# Patient Record
Sex: MALE | Race: WHITE | Employment: FULL TIME | URBAN - METROPOLITAN AREA
[De-identification: names, ages, dates, MRNs, and addresses within clinical notes are randomized per-mention and may not be internally consistent; named-entity substitution may affect disease eponyms.]

---

## 2024-08-10 ENCOUNTER — HOSPITAL ENCOUNTER (INPATIENT)
Age: 54
LOS: 1 days | Discharge: LEFT BEFORE DISCHARGE | DRG: 770 | End: 2024-08-11
Attending: EMERGENCY MEDICINE | Admitting: INTERNAL MEDICINE
Payer: COMMERCIAL

## 2024-08-10 DIAGNOSIS — F10.10 ALCOHOL ABUSE: Primary | ICD-10-CM

## 2024-08-10 PROBLEM — F10.139 ALCOHOL ABUSE WITH WITHDRAWAL (HCC): Status: ACTIVE | Noted: 2024-08-10

## 2024-08-10 LAB
ALBUMIN SERPL-MCNC: 4.6 G/DL (ref 3.4–5)
ALP SERPL-CCNC: 173 U/L (ref 40–129)
ALT SERPL-CCNC: 47 U/L (ref 10–40)
AMMONIA PLAS-SCNC: 35 UMOL/L (ref 16–60)
ANION GAP SERPL CALCULATED.3IONS-SCNC: 12 MMOL/L (ref 3–16)
AST SERPL-CCNC: 78 U/L (ref 15–37)
BASOPHILS # BLD: 0.1 K/UL (ref 0–0.2)
BASOPHILS NFR BLD: 0.9 %
BILIRUB DIRECT SERPL-MCNC: 0.3 MG/DL (ref 0–0.3)
BILIRUB INDIRECT SERPL-MCNC: 0.4 MG/DL (ref 0–1)
BILIRUB SERPL-MCNC: 0.7 MG/DL (ref 0–1)
BILIRUB UR QL STRIP.AUTO: NEGATIVE
BUN SERPL-MCNC: 10 MG/DL (ref 7–20)
CALCIUM SERPL-MCNC: 9 MG/DL (ref 8.3–10.6)
CHLORIDE SERPL-SCNC: 94 MMOL/L (ref 99–110)
CLARITY UR: CLEAR
CO2 SERPL-SCNC: 26 MMOL/L (ref 21–32)
COLOR UR: YELLOW
CREAT SERPL-MCNC: <0.5 MG/DL (ref 0.9–1.3)
DEPRECATED RDW RBC AUTO: 14.7 % (ref 12.4–15.4)
EKG ATRIAL RATE: 79 BPM
EKG DIAGNOSIS: NORMAL
EKG P AXIS: 32 DEGREES
EKG P-R INTERVAL: 166 MS
EKG Q-T INTERVAL: 402 MS
EKG QRS DURATION: 104 MS
EKG QTC CALCULATION (BAZETT): 460 MS
EKG R AXIS: -61 DEGREES
EKG T AXIS: 44 DEGREES
EKG VENTRICULAR RATE: 79 BPM
EOSINOPHIL # BLD: 0.2 K/UL (ref 0–0.6)
EOSINOPHIL NFR BLD: 2.2 %
ETHANOLAMINE SERPL-MCNC: 166 MG/DL (ref 0–0.08)
GFR SERPLBLD CREATININE-BSD FMLA CKD-EPI: >90 ML/MIN/{1.73_M2}
GLUCOSE SERPL-MCNC: 115 MG/DL (ref 70–99)
GLUCOSE UR STRIP.AUTO-MCNC: NEGATIVE MG/DL
HCT VFR BLD AUTO: 44.2 % (ref 40.5–52.5)
HGB BLD-MCNC: 14.7 G/DL (ref 13.5–17.5)
HGB UR QL STRIP.AUTO: NEGATIVE
KETONES UR STRIP.AUTO-MCNC: NEGATIVE MG/DL
LEUKOCYTE ESTERASE UR QL STRIP.AUTO: NEGATIVE
LIPASE SERPL-CCNC: 26 U/L (ref 13–60)
LYMPHOCYTES # BLD: 2.6 K/UL (ref 1–5.1)
LYMPHOCYTES NFR BLD: 26.4 %
MCH RBC QN AUTO: 28.9 PG (ref 26–34)
MCHC RBC AUTO-ENTMCNC: 33.4 G/DL (ref 31–36)
MCV RBC AUTO: 86.6 FL (ref 80–100)
MONOCYTES # BLD: 0.7 K/UL (ref 0–1.3)
MONOCYTES NFR BLD: 6.7 %
NEUTROPHILS # BLD: 6.2 K/UL (ref 1.7–7.7)
NEUTROPHILS NFR BLD: 63.8 %
NITRITE UR QL STRIP.AUTO: NEGATIVE
PH UR STRIP.AUTO: 6 [PH] (ref 5–8)
PLATELET # BLD AUTO: 200 K/UL (ref 135–450)
PMV BLD AUTO: 7.1 FL (ref 5–10.5)
POTASSIUM SERPL-SCNC: 3.9 MMOL/L (ref 3.5–5.1)
PROT SERPL-MCNC: 8 G/DL (ref 6.4–8.2)
PROT UR STRIP.AUTO-MCNC: NEGATIVE MG/DL
RBC # BLD AUTO: 5.1 M/UL (ref 4.2–5.9)
SODIUM SERPL-SCNC: 132 MMOL/L (ref 136–145)
SP GR UR STRIP.AUTO: <=1.005 (ref 1–1.03)
UA COMPLETE W REFLEX CULTURE PNL UR: NORMAL
UA DIPSTICK W REFLEX MICRO PNL UR: NORMAL
URN SPEC COLLECT METH UR: NORMAL
UROBILINOGEN UR STRIP-ACNC: 0.2 E.U./DL
WBC # BLD AUTO: 9.8 K/UL (ref 4–11)

## 2024-08-10 PROCEDURE — 99285 EMERGENCY DEPT VISIT HI MDM: CPT

## 2024-08-10 PROCEDURE — 2500000003 HC RX 250 WO HCPCS: Performed by: PHYSICIAN ASSISTANT

## 2024-08-10 PROCEDURE — 80048 BASIC METABOLIC PNL TOTAL CA: CPT

## 2024-08-10 PROCEDURE — 83690 ASSAY OF LIPASE: CPT

## 2024-08-10 PROCEDURE — 96365 THER/PROPH/DIAG IV INF INIT: CPT

## 2024-08-10 PROCEDURE — 6370000000 HC RX 637 (ALT 250 FOR IP): Performed by: PHYSICIAN ASSISTANT

## 2024-08-10 PROCEDURE — 85025 COMPLETE CBC W/AUTO DIFF WBC: CPT

## 2024-08-10 PROCEDURE — 6360000002 HC RX W HCPCS: Performed by: NURSE PRACTITIONER

## 2024-08-10 PROCEDURE — 80076 HEPATIC FUNCTION PANEL: CPT

## 2024-08-10 PROCEDURE — 36415 COLL VENOUS BLD VENIPUNCTURE: CPT

## 2024-08-10 PROCEDURE — 2580000003 HC RX 258: Performed by: PHYSICIAN ASSISTANT

## 2024-08-10 PROCEDURE — 6360000002 HC RX W HCPCS: Performed by: PHYSICIAN ASSISTANT

## 2024-08-10 PROCEDURE — 82140 ASSAY OF AMMONIA: CPT

## 2024-08-10 PROCEDURE — 1200000000 HC SEMI PRIVATE

## 2024-08-10 PROCEDURE — 81003 URINALYSIS AUTO W/O SCOPE: CPT

## 2024-08-10 PROCEDURE — 93005 ELECTROCARDIOGRAM TRACING: CPT | Performed by: PHYSICIAN ASSISTANT

## 2024-08-10 PROCEDURE — 2580000003 HC RX 258: Performed by: NURSE PRACTITIONER

## 2024-08-10 PROCEDURE — 93010 ELECTROCARDIOGRAM REPORT: CPT | Performed by: INTERNAL MEDICINE

## 2024-08-10 PROCEDURE — 6370000000 HC RX 637 (ALT 250 FOR IP): Performed by: NURSE PRACTITIONER

## 2024-08-10 PROCEDURE — 82077 ASSAY SPEC XCP UR&BREATH IA: CPT

## 2024-08-10 RX ORDER — LORAZEPAM 2 MG/1
2 TABLET ORAL
Status: DISCONTINUED | OUTPATIENT
Start: 2024-08-10 | End: 2024-08-10

## 2024-08-10 RX ORDER — ONDANSETRON 4 MG/1
4 TABLET, ORALLY DISINTEGRATING ORAL EVERY 8 HOURS PRN
Status: CANCELLED | OUTPATIENT
Start: 2024-08-10

## 2024-08-10 RX ORDER — LANOLIN ALCOHOL/MO/W.PET/CERES
100 CREAM (GRAM) TOPICAL DAILY
Status: DISCONTINUED | OUTPATIENT
Start: 2024-08-10 | End: 2024-08-11 | Stop reason: HOSPADM

## 2024-08-10 RX ORDER — SODIUM CHLORIDE 0.9 % (FLUSH) 0.9 %
5-40 SYRINGE (ML) INJECTION EVERY 12 HOURS SCHEDULED
Status: DISCONTINUED | OUTPATIENT
Start: 2024-08-10 | End: 2024-08-10

## 2024-08-10 RX ORDER — SODIUM CHLORIDE 0.9 % (FLUSH) 0.9 %
5-40 SYRINGE (ML) INJECTION PRN
Status: DISCONTINUED | OUTPATIENT
Start: 2024-08-10 | End: 2024-08-10

## 2024-08-10 RX ORDER — M-VIT,TX,IRON,MINS/CALC/FOLIC 27MG-0.4MG
1 TABLET ORAL DAILY
COMMUNITY

## 2024-08-10 RX ORDER — LORAZEPAM 2 MG/ML
3 INJECTION INTRAMUSCULAR
Status: DISCONTINUED | OUTPATIENT
Start: 2024-08-10 | End: 2024-08-11 | Stop reason: HOSPADM

## 2024-08-10 RX ORDER — LORAZEPAM 1 MG/1
1 TABLET ORAL
Status: DISCONTINUED | OUTPATIENT
Start: 2024-08-10 | End: 2024-08-10

## 2024-08-10 RX ORDER — LORAZEPAM 2 MG/ML
2 INJECTION INTRAMUSCULAR
Status: DISCONTINUED | OUTPATIENT
Start: 2024-08-10 | End: 2024-08-11 | Stop reason: HOSPADM

## 2024-08-10 RX ORDER — ACETAMINOPHEN 325 MG/1
650 TABLET ORAL EVERY 6 HOURS PRN
Status: DISCONTINUED | OUTPATIENT
Start: 2024-08-10 | End: 2024-08-11 | Stop reason: HOSPADM

## 2024-08-10 RX ORDER — POTASSIUM CHLORIDE 7.45 MG/ML
10 INJECTION INTRAVENOUS PRN
Status: CANCELLED | OUTPATIENT
Start: 2024-08-10

## 2024-08-10 RX ORDER — POTASSIUM CHLORIDE 20 MEQ/1
40 TABLET, EXTENDED RELEASE ORAL PRN
Status: CANCELLED | OUTPATIENT
Start: 2024-08-10

## 2024-08-10 RX ORDER — LORAZEPAM 1 MG/1
1 TABLET ORAL
Status: DISCONTINUED | OUTPATIENT
Start: 2024-08-10 | End: 2024-08-11 | Stop reason: HOSPADM

## 2024-08-10 RX ORDER — ENOXAPARIN SODIUM 100 MG/ML
40 INJECTION SUBCUTANEOUS DAILY
Status: CANCELLED | OUTPATIENT
Start: 2024-08-10

## 2024-08-10 RX ORDER — M-VIT,TX,IRON,MINS/CALC/FOLIC 27MG-0.4MG
1 TABLET ORAL DAILY
Status: DISCONTINUED | OUTPATIENT
Start: 2024-08-10 | End: 2024-08-11 | Stop reason: HOSPADM

## 2024-08-10 RX ORDER — SODIUM CHLORIDE 0.9 % (FLUSH) 0.9 %
5-40 SYRINGE (ML) INJECTION EVERY 12 HOURS SCHEDULED
Status: CANCELLED | OUTPATIENT
Start: 2024-08-10

## 2024-08-10 RX ORDER — LORAZEPAM 2 MG/ML
2 INJECTION INTRAMUSCULAR PRN
Status: DISCONTINUED | OUTPATIENT
Start: 2024-08-10 | End: 2024-08-10

## 2024-08-10 RX ORDER — POLYETHYLENE GLYCOL 3350 17 G/17G
17 POWDER, FOR SOLUTION ORAL DAILY PRN
Status: CANCELLED | OUTPATIENT
Start: 2024-08-10

## 2024-08-10 RX ORDER — LORAZEPAM 2 MG/ML
3 INJECTION INTRAMUSCULAR
Status: DISCONTINUED | OUTPATIENT
Start: 2024-08-10 | End: 2024-08-10

## 2024-08-10 RX ORDER — ACETAMINOPHEN 650 MG/1
650 SUPPOSITORY RECTAL EVERY 6 HOURS PRN
Status: CANCELLED | OUTPATIENT
Start: 2024-08-10

## 2024-08-10 RX ORDER — POTASSIUM CHLORIDE 20 MEQ/1
40 TABLET, EXTENDED RELEASE ORAL PRN
Status: DISCONTINUED | OUTPATIENT
Start: 2024-08-10 | End: 2024-08-11 | Stop reason: HOSPADM

## 2024-08-10 RX ORDER — SODIUM CHLORIDE 0.9 % (FLUSH) 0.9 %
5-40 SYRINGE (ML) INJECTION PRN
Status: DISCONTINUED | OUTPATIENT
Start: 2024-08-10 | End: 2024-08-11 | Stop reason: HOSPADM

## 2024-08-10 RX ORDER — LORAZEPAM 2 MG/1
2 TABLET ORAL
Status: DISCONTINUED | OUTPATIENT
Start: 2024-08-10 | End: 2024-08-11 | Stop reason: HOSPADM

## 2024-08-10 RX ORDER — ENOXAPARIN SODIUM 100 MG/ML
30 INJECTION SUBCUTANEOUS 2 TIMES DAILY
Status: DISCONTINUED | OUTPATIENT
Start: 2024-08-10 | End: 2024-08-11 | Stop reason: HOSPADM

## 2024-08-10 RX ORDER — SODIUM CHLORIDE 9 MG/ML
INJECTION, SOLUTION INTRAVENOUS PRN
Status: CANCELLED | OUTPATIENT
Start: 2024-08-10

## 2024-08-10 RX ORDER — LORAZEPAM 2 MG/ML
1 INJECTION INTRAMUSCULAR
Status: DISCONTINUED | OUTPATIENT
Start: 2024-08-10 | End: 2024-08-10

## 2024-08-10 RX ORDER — ACETAMINOPHEN 650 MG/1
650 SUPPOSITORY RECTAL EVERY 6 HOURS PRN
Status: DISCONTINUED | OUTPATIENT
Start: 2024-08-10 | End: 2024-08-11 | Stop reason: HOSPADM

## 2024-08-10 RX ORDER — ONDANSETRON 2 MG/ML
4 INJECTION INTRAMUSCULAR; INTRAVENOUS EVERY 6 HOURS PRN
Status: DISCONTINUED | OUTPATIENT
Start: 2024-08-10 | End: 2024-08-11 | Stop reason: HOSPADM

## 2024-08-10 RX ORDER — LORAZEPAM 2 MG/ML
1 INJECTION INTRAMUSCULAR
Status: DISCONTINUED | OUTPATIENT
Start: 2024-08-10 | End: 2024-08-11 | Stop reason: HOSPADM

## 2024-08-10 RX ORDER — SODIUM CHLORIDE 9 MG/ML
INJECTION, SOLUTION INTRAVENOUS CONTINUOUS
Status: DISCONTINUED | OUTPATIENT
Start: 2024-08-10 | End: 2024-08-11 | Stop reason: HOSPADM

## 2024-08-10 RX ORDER — LANOLIN ALCOHOL/MO/W.PET/CERES
100 CREAM (GRAM) TOPICAL DAILY
Status: DISCONTINUED | OUTPATIENT
Start: 2024-08-11 | End: 2024-08-10

## 2024-08-10 RX ORDER — SODIUM CHLORIDE 0.9 % (FLUSH) 0.9 %
5-40 SYRINGE (ML) INJECTION PRN
Status: CANCELLED | OUTPATIENT
Start: 2024-08-10

## 2024-08-10 RX ORDER — ENOXAPARIN SODIUM 100 MG/ML
40 INJECTION SUBCUTANEOUS DAILY
Status: DISCONTINUED | OUTPATIENT
Start: 2024-08-10 | End: 2024-08-10 | Stop reason: DRUGHIGH

## 2024-08-10 RX ORDER — LORAZEPAM 2 MG/ML
4 INJECTION INTRAMUSCULAR
Status: DISCONTINUED | OUTPATIENT
Start: 2024-08-10 | End: 2024-08-10

## 2024-08-10 RX ORDER — DIAZEPAM 5 MG/1
5 TABLET ORAL EVERY 6 HOURS PRN
COMMUNITY

## 2024-08-10 RX ORDER — SODIUM CHLORIDE 9 MG/ML
INJECTION, SOLUTION INTRAVENOUS PRN
Status: DISCONTINUED | OUTPATIENT
Start: 2024-08-10 | End: 2024-08-11 | Stop reason: HOSPADM

## 2024-08-10 RX ORDER — MAGNESIUM SULFATE IN WATER 40 MG/ML
2000 INJECTION, SOLUTION INTRAVENOUS PRN
Status: CANCELLED | OUTPATIENT
Start: 2024-08-10

## 2024-08-10 RX ORDER — SODIUM CHLORIDE 9 MG/ML
INJECTION, SOLUTION INTRAVENOUS PRN
Status: DISCONTINUED | OUTPATIENT
Start: 2024-08-10 | End: 2024-08-10

## 2024-08-10 RX ORDER — POTASSIUM CHLORIDE 7.45 MG/ML
10 INJECTION INTRAVENOUS PRN
Status: DISCONTINUED | OUTPATIENT
Start: 2024-08-10 | End: 2024-08-11 | Stop reason: HOSPADM

## 2024-08-10 RX ORDER — LORAZEPAM 2 MG/ML
4 INJECTION INTRAMUSCULAR
Status: DISCONTINUED | OUTPATIENT
Start: 2024-08-10 | End: 2024-08-11 | Stop reason: HOSPADM

## 2024-08-10 RX ORDER — SODIUM CHLORIDE 0.9 % (FLUSH) 0.9 %
5-40 SYRINGE (ML) INJECTION EVERY 12 HOURS SCHEDULED
Status: DISCONTINUED | OUTPATIENT
Start: 2024-08-10 | End: 2024-08-11 | Stop reason: HOSPADM

## 2024-08-10 RX ORDER — ACETAMINOPHEN 325 MG/1
650 TABLET ORAL EVERY 6 HOURS PRN
Status: CANCELLED | OUTPATIENT
Start: 2024-08-10

## 2024-08-10 RX ORDER — ONDANSETRON 4 MG/1
4 TABLET, ORALLY DISINTEGRATING ORAL EVERY 8 HOURS PRN
Status: DISCONTINUED | OUTPATIENT
Start: 2024-08-10 | End: 2024-08-11 | Stop reason: HOSPADM

## 2024-08-10 RX ORDER — POLYETHYLENE GLYCOL 3350 17 G/17G
17 POWDER, FOR SOLUTION ORAL DAILY PRN
Status: DISCONTINUED | OUTPATIENT
Start: 2024-08-10 | End: 2024-08-11 | Stop reason: HOSPADM

## 2024-08-10 RX ORDER — LORAZEPAM 2 MG/ML
2 INJECTION INTRAMUSCULAR
Status: DISCONTINUED | OUTPATIENT
Start: 2024-08-10 | End: 2024-08-10

## 2024-08-10 RX ORDER — LORAZEPAM 2 MG/1
4 TABLET ORAL
Status: DISCONTINUED | OUTPATIENT
Start: 2024-08-10 | End: 2024-08-11 | Stop reason: HOSPADM

## 2024-08-10 RX ORDER — LORAZEPAM 2 MG/1
4 TABLET ORAL
Status: DISCONTINUED | OUTPATIENT
Start: 2024-08-10 | End: 2024-08-10

## 2024-08-10 RX ORDER — ONDANSETRON 2 MG/ML
4 INJECTION INTRAMUSCULAR; INTRAVENOUS EVERY 6 HOURS PRN
Status: CANCELLED | OUTPATIENT
Start: 2024-08-10

## 2024-08-10 RX ADMIN — Medication 10 ML: at 19:52

## 2024-08-10 RX ADMIN — Medication 100 MG: at 19:49

## 2024-08-10 RX ADMIN — LORAZEPAM 1 MG: 1 TABLET ORAL at 15:39

## 2024-08-10 RX ADMIN — ENOXAPARIN SODIUM 30 MG: 100 INJECTION SUBCUTANEOUS at 21:02

## 2024-08-10 RX ADMIN — LORAZEPAM 2 MG: 2 INJECTION INTRAMUSCULAR; INTRAVENOUS at 17:58

## 2024-08-10 RX ADMIN — FOLIC ACID: 5 INJECTION, SOLUTION INTRAMUSCULAR; INTRAVENOUS; SUBCUTANEOUS at 15:59

## 2024-08-10 RX ADMIN — LORAZEPAM 2 MG: 2 INJECTION INTRAMUSCULAR; INTRAVENOUS at 19:49

## 2024-08-10 RX ADMIN — SODIUM CHLORIDE: 9 INJECTION, SOLUTION INTRAVENOUS at 19:30

## 2024-08-10 RX ADMIN — I-VITE, TAB 1000-60-2MG (60/BT) 1 TABLET: TAB at 19:49

## 2024-08-10 ASSESSMENT — PAIN SCALES - GENERAL
PAINLEVEL_OUTOF10: 0

## 2024-08-10 ASSESSMENT — PAIN - FUNCTIONAL ASSESSMENT
PAIN_FUNCTIONAL_ASSESSMENT: 0-10
PAIN_FUNCTIONAL_ASSESSMENT: NONE - DENIES PAIN
PAIN_FUNCTIONAL_ASSESSMENT: 0-10

## 2024-08-10 ASSESSMENT — LIFESTYLE VARIABLES
HOW OFTEN DO YOU HAVE A DRINK CONTAINING ALCOHOL: 4 OR MORE TIMES A WEEK
HOW MANY STANDARD DRINKS CONTAINING ALCOHOL DO YOU HAVE ON A TYPICAL DAY: 10 OR MORE

## 2024-08-10 NOTE — ED PROVIDER NOTES
[address] MEDICAL-SURGICAL  EMERGENCY DEPARTMENT ENCOUNTER        Pt Name: Rod Herring  MRN: [id number]  Birthdate 1970  Date of evaluation: 8/10/2024  Provider: Lamont Mayorga PA-C  PCP: Alexei Aguirre MD  Note Started: 4:36 PM EDT 8/10/24       I have seen and evaluated this patient with my supervising physician Gamal Terry MD.      CHIEF COMPLAINT       Chief Complaint   Patient presents with    Alcohol Problem     Patient states that he drinks 30 beers a day, wants to stop drinking.       HISTORY OF PRESENT ILLNESS: 1 or more Elements     History From: Patient    Limitations to history : None    Social Determinants Significantly Affecting Health : None    Chief Complaint: Alcohol abuse, withdrawal    Rod Herring is a 54 y.o. male who presents to the ED requesting admission for treatment for alcohol abuse.  Patient has extensive history of alcohol abuse.  He was admitted for alcohol abuse and detox about 3 years ago and was able to stay sober for about 2 years.  Approximately 1 year ago he began drinking again and is now drinking roughly 30 beers per day.  Patient's last drink was at 130 this afternoon.  He denies any complaints as of this time but has had problems with withdrawal in the past.  Patient does take Valium 5 mg 3 times daily for this.  He admits to a 20 pound weight gain in roughly 2 months.  He denies any additional complaints.  He denies fever, rash, nausea, vomiting, weakness, dizziness, chest pain or difficulty breathing, back pain, abdominal pain, shaking/tremors, difficulty speaking, change in bowels or urine, confusion, neck pain or stiffness, or any additional systemic or neurologic complaints.  He admits to several month history of bilateral peripheral neuropathy in his feet.  He is prediabetic and his A1c measurements have become much worse since he began drinking again.    Nursing Notes were all reviewed and agreed with or any disagreements were addressed

## 2024-08-10 NOTE — PLAN OF CARE
Admit to 2w    Alcohol withdrawal   On PRN Valium at home- did not order on admission  CIWA with PRN ativan       Orquieda Adair, NADEEM - CNP

## 2024-08-10 NOTE — FLOWSHEET NOTE
08/10/24 1842   Vital Signs   Temp 98 °F (36.7 °C)   Temp Source Oral   Pulse 95   Respirations 16   BP (!) 159/92   MAP (Calculated) 114   BP Location Left upper arm   BP Method Automatic   Patient Position High fowlers   Pain Assessment   Pain Assessment None - Denies Pain   Opioid-Induced Sedation   POSS Score 1   RASS   Solis Agitation Sedation Scale (RASS) 0   Oxygen Therapy   SpO2 96 %   O2 Device None (Room air)       Alert and oriented. Arrived from ER. Skin warm and moist.  Resp ee unlabored. Called respiratory informing of cpap from home.  Orders released.  CHG wipes given to patient. Dietary called for meal tray.  VSS. No s/s distress noted. Bed alarm on. Telesitter in room.

## 2024-08-10 NOTE — PLAN OF CARE
Problem: Discharge Planning  Goal: Discharge to home or other facility with appropriate resources  Outcome: Progressing  Flowsheets (Taken 8/10/2024 3308)  Discharge to home or other facility with appropriate resources: Identify barriers to discharge with patient and caregiver     Problem: Safety - Adult  Goal: Free from fall injury  Outcome: Progressing

## 2024-08-10 NOTE — ED PROVIDER NOTES
THIS IS MY MATTHEW SUPERVISORY AND SHARED VISIT NOTE:    I personally saw the patient and made/approved the management plan and take responsibility for the patient management.    History: 54-year-old male presenting for evaluation of alcohol problem.  Patient reports he drinks about 30 beers on a daily basis and wants to quit drinking alcohol.  He states that he has low quality of life and wants to stop drinking.  He reports that he has gone to the hospital about 8 years ago for alcohol detox and noted to had quite severe alcohol withdrawal symptoms at that time.  He was sober for about 2 years after that.  He states that he is motivated to stop drinking this time.    Exam: No acute distress alert and oriented x 4 no respiratory distress no obvious tremors.  No seizure-like activity    MDM: 54-year-old male presenting for evaluation of alcohol problem he is seeking alcohol detox.  He is hypertensive on initial vital signs otherwise vitals are within normal limits.  Will obtain laboratory evaluation, [facility] protocol.  Due to significant alcohol intake, patient will likely have withdrawal symptoms shortly.  Patient is agreeable with plan for admission to facilitate alcohol detoxification.      I personally saw the patient and independently provided 0 minutes of non-concurrent critical care out of the total shared critical care time provided.       No results found.      I, Dr. Terry, am the primary clinician of record.     Comment: Please note this report has been produced using speech recognition software and may contain errors related to that system including errors in grammar, punctuation, and spelling, as well as words and phrases that may be inappropriate. If there are any questions or concerns please feel free to contact the dictating provider for clarification.     EKG  The Ekg interpreted by myself in the emergency department in the absence of a cardiologist.  normal sinus rhythm with a rate of 79  Axis is

## 2024-08-11 VITALS
HEART RATE: 73 BPM | HEIGHT: 70 IN | BODY MASS INDEX: 32.21 KG/M2 | OXYGEN SATURATION: 97 % | TEMPERATURE: 98.2 F | DIASTOLIC BLOOD PRESSURE: 86 MMHG | WEIGHT: 225 LBS | RESPIRATION RATE: 18 BRPM | SYSTOLIC BLOOD PRESSURE: 164 MMHG

## 2024-08-11 LAB
ANION GAP SERPL CALCULATED.3IONS-SCNC: 9 MMOL/L (ref 3–16)
BASOPHILS # BLD: 0 K/UL (ref 0–0.2)
BASOPHILS NFR BLD: 0.6 %
BUN SERPL-MCNC: 11 MG/DL (ref 7–20)
CALCIUM SERPL-MCNC: 8.8 MG/DL (ref 8.3–10.6)
CHLORIDE SERPL-SCNC: 101 MMOL/L (ref 99–110)
CO2 SERPL-SCNC: 27 MMOL/L (ref 21–32)
CREAT SERPL-MCNC: 0.6 MG/DL (ref 0.9–1.3)
DEPRECATED RDW RBC AUTO: 15.1 % (ref 12.4–15.4)
EOSINOPHIL # BLD: 0.3 K/UL (ref 0–0.6)
EOSINOPHIL NFR BLD: 3.9 %
GFR SERPLBLD CREATININE-BSD FMLA CKD-EPI: >90 ML/MIN/{1.73_M2}
GLUCOSE SERPL-MCNC: 108 MG/DL (ref 70–99)
HCT VFR BLD AUTO: 39.6 % (ref 40.5–52.5)
HGB BLD-MCNC: 13.5 G/DL (ref 13.5–17.5)
LYMPHOCYTES # BLD: 2.2 K/UL (ref 1–5.1)
LYMPHOCYTES NFR BLD: 28.8 %
MCH RBC QN AUTO: 29.3 PG (ref 26–34)
MCHC RBC AUTO-ENTMCNC: 34.1 G/DL (ref 31–36)
MCV RBC AUTO: 86.2 FL (ref 80–100)
MONOCYTES # BLD: 0.7 K/UL (ref 0–1.3)
MONOCYTES NFR BLD: 9.2 %
NEUTROPHILS # BLD: 4.4 K/UL (ref 1.7–7.7)
NEUTROPHILS NFR BLD: 57.5 %
PLATELET # BLD AUTO: 173 K/UL (ref 135–450)
PMV BLD AUTO: 7.7 FL (ref 5–10.5)
POTASSIUM SERPL-SCNC: 3.8 MMOL/L (ref 3.5–5.1)
RBC # BLD AUTO: 4.6 M/UL (ref 4.2–5.9)
SODIUM SERPL-SCNC: 137 MMOL/L (ref 136–145)
WBC # BLD AUTO: 7.7 K/UL (ref 4–11)

## 2024-08-11 PROCEDURE — 85025 COMPLETE CBC W/AUTO DIFF WBC: CPT

## 2024-08-11 PROCEDURE — 6360000002 HC RX W HCPCS: Performed by: NURSE PRACTITIONER

## 2024-08-11 PROCEDURE — 99232 SBSQ HOSP IP/OBS MODERATE 35: CPT | Performed by: INTERNAL MEDICINE

## 2024-08-11 PROCEDURE — 80048 BASIC METABOLIC PNL TOTAL CA: CPT

## 2024-08-11 PROCEDURE — 2580000003 HC RX 258: Performed by: NURSE PRACTITIONER

## 2024-08-11 PROCEDURE — 36415 COLL VENOUS BLD VENIPUNCTURE: CPT

## 2024-08-11 PROCEDURE — 6370000000 HC RX 637 (ALT 250 FOR IP): Performed by: INTERNAL MEDICINE

## 2024-08-11 PROCEDURE — 6370000000 HC RX 637 (ALT 250 FOR IP): Performed by: NURSE PRACTITIONER

## 2024-08-11 RX ORDER — NICOTINE 21 MG/24HR
1 PATCH, TRANSDERMAL 24 HOURS TRANSDERMAL DAILY
Status: DISCONTINUED | OUTPATIENT
Start: 2024-08-11 | End: 2024-08-11 | Stop reason: HOSPADM

## 2024-08-11 RX ADMIN — SODIUM CHLORIDE: 9 INJECTION, SOLUTION INTRAVENOUS at 11:17

## 2024-08-11 RX ADMIN — ENOXAPARIN SODIUM 30 MG: 100 INJECTION SUBCUTANEOUS at 07:57

## 2024-08-11 RX ADMIN — Medication 100 MG: at 07:58

## 2024-08-11 RX ADMIN — I-VITE, TAB 1000-60-2MG (60/BT) 1 TABLET: TAB at 07:58

## 2024-08-11 RX ADMIN — LORAZEPAM 1 MG: 1 TABLET ORAL at 16:07

## 2024-08-11 NOTE — PROGRESS NOTES
4 Eyes Skin Assessment     NAME:  Rod Herring  YOB: 1970  MEDICAL RECORD NUMBER:  [id number]    The patient is being assessed for  Admission    I agree that at least one RN has performed a thorough Head to Toe Skin Assessment on the patient. ALL assessment sites listed below have been assessed.      Areas assessed by both nurses:    Head, Face, Ears, Shoulders, Back, Chest, Arms, Elbows, Hands, Sacrum. Buttock, Coccyx, Ischium, and Legs. Feet and Heels        Does the Patient have a Wound? No noted wound(s)       Oscar Prevention initiated by RN: No  Wound Care Orders initiated by RN: No    Pressure Injury (Stage 3,4, Unstageable, DTI, NWPT, and Complex wounds) if present, place Wound referral order by RN under : No    New Ostomies, if present place, Ostomy referral order under : No     Nurse 1 eSignature: Electronically signed by Rhonda Pompa RN on 8/10/24 at 6:53 PM EDT    **SHARE this note so that the co-signing nurse can place an eSignature**    Nurse 2 eSignature: Electronically signed by Betsy Ennis RN on 8/11/24 at 5:17 AM EDT    
Bedside Mobility Assessment Tool (BMAT):     Assessment Level 1- Sit and Shake    1. From a semi-reclined position, ask patient to sit up and rotate to a seated position at the side of the bed. Can use the bedrail.    2. Ask patient to reach out and grab your hand and shake making sure patient reaches across his/her midline.   Pass- Patient is able to come to a seated position, maintain core strength. Maintains seated balance while reaching across midline. Move on to Assessment Level 2.     Assessment Level 2- Stretch and Point   1. With patient in seated position at the side of the bed, have patient place both feet on the floor (or stool) with knees no higher than hips.    2. Ask patient to stretch one leg and straighten the knee, then bend the ankle/flex and point the toes. If appropriate, repeat with the other leg.   Pass- Patient is able to demonstrate appropriate quad strength on intended weight bearing limb(s). Move onto Assessment Level 3.     Assessment Level 3- Stand   1. Ask patient to elevate off the bed or chair (seated to standing) using an assistive device (cane, bedrail).    2. Patient should be able to raise buttocks off be and hold for a count of five. May repeat once.   Pass- Patient maintains standing stability for at least 5 seconds, proceed to assessment level 4.    Assessment Level 4- Walk   1. Ask patient to march in place at bedside.    2. Then ask patient to advance step and return each foot. Some medical conditions may render a patient from stepping backwards, use your best clinical judgement.   Pass- Patient demonstrates balance while shifting weight and ability to step, takes independent steps, does not use assistive device patient is MOBILITY LEVEL 4.      Mobility Level- 4    
Bedside Mobility Assessment Tool (BMAT):     Assessment Level 1- Sit and Shake    1. From a semi-reclined position, ask patient to sit up and rotate to a seated position at the side of the bed. Can use the bedrail.    2. Ask patient to reach out and grab your hand and shake making sure patient reaches across his/her midline.   Pass- Patient is able to come to a seated position, maintain core strength. Maintains seated balance while reaching across midline. Move on to Assessment Level 2.     Assessment Level 2- Stretch and Point   1. With patient in seated position at the side of the bed, have patient place both feet on the floor (or stool) with knees no higher than hips.    2. Ask patient to stretch one leg and straighten the knee, then bend the ankle/flex and point the toes. If appropriate, repeat with the other leg.   Pass- Patient is able to demonstrate appropriate quad strength on intended weight bearing limb(s). Move onto Assessment Level 3.     Assessment Level 3- Stand   1. Ask patient to elevate off the bed or chair (seated to standing) using an assistive device (cane, bedrail).    2. Patient should be able to raise buttocks off be and hold for a count of five. May repeat once.   Pass- Patient maintains standing stability for at least 5 seconds, proceed to assessment level 4.    Assessment Level 4- Walk   1. Ask patient to march in place at bedside.    2. Then ask patient to advance step and return each foot. Some medical conditions may render a patient from stepping backwards, use your best clinical judgement.   Pass- Patient demonstrates balance while shifting weight and ability to step, takes independent steps, does not use assistive device patient is MOBILITY LEVEL 4.      Mobility Level- 4     Patient is able to demonstrate the ability to move from a reclining position to an upright position within the recliner.   
No acute events noted throughout the shift. VSS. CIWA's x 2 with highest score of 13. No complaints of pain. Home CPAP has been in use throughout the shift. Normal saline infusing at 125 ml/hr per provider order. Tele sitter in place and active. Bed alarm active. Call bell within reach and side rails up x 2.  
Patient is able to demonstrate the ability to move from a reclining position to an upright position within the recliner.    
Pt home medication of Diazepam 5mg was returned to pt. PT refused for RN's to count medication. Tamper seal was intact when medication was returned to pt.    Electronically signed by Charleen Weiss RN on 8/11/2024 at 5:38 PM       Electronically signed by LANE GALLOWAY RN on 8/11/2024 at 5:39 PM   
Pt is leaving AMA, education provided. MD notified.  
Telesitter called writer stating pt put a pillow infront of his face and smoke was coming out. Writer and charge nurse went to speak with pt. Pt was asked if he was vaping in his room. Pt stated he was vaping. Pt educated on hospital policy and verbalized understanding. Vape was locked in pts  room. Nicotine patch ordered.   
mEq, PRN   Or  potassium alternative oral replacement, 40 mEq, PRN   Or  potassium chloride, 10 mEq, PRN  ondansetron, 4 mg, Q8H PRN   Or  ondansetron, 4 mg, Q6H PRN  polyethylene glycol, 17 g, Daily PRN  acetaminophen, 650 mg, Q6H PRN   Or  acetaminophen, 650 mg, Q6H PRN  LORazepam, 1 mg, Q1H PRN   Or  LORazepam, 1 mg, Q1H PRN   Or  LORazepam, 2 mg, Q1H PRN   Or  LORazepam, 2 mg, Q1H PRN   Or  LORazepam, 3 mg, Q1H PRN   Or  LORazepam, 3 mg, Q1H PRN   Or  LORazepam, 4 mg, Q1H PRN   Or  LORazepam, 4 mg, Q1H PRN          Data Review:      Laboratory Data Reviewed:    CBC:  Lab Results   Component Value Date    WBC 7.7 08/11/2024    HGB 13.5 08/11/2024    HCT 39.6 (L) 08/11/2024    MCV 86.2 08/11/2024     08/11/2024         Basic Metabolic Panel  Lab Results   Component Value Date/Time     08/11/2024 05:40 AM     08/11/2024 05:40 AM    CO2 27 08/11/2024 05:40 AM    GLUCOSE 108 08/11/2024 05:40 AM    BUN 11 08/11/2024 05:40 AM    CREATININE 0.6 08/11/2024 05:40 AM       HEPATIC PANEL   Lab Results   Component Value Date/Time    AST 78 08/10/2024 02:24 PM    ALT 47 08/10/2024 02:24 PM       Lab Results   Component Value Date    TROPONINI <0.01 07/31/2016       No results found for: \"INR\"    Test Review:        Radiology reviewed:     No orders to display         No results found for: \"LABA1C\"   Body mass index is 32.28 kg/m².       Impression/Plan:      This is a very pleasant 53 yo gentleman presenting for alcohol withdrawal. So far his symptoms are quite mild but he wants to withdraw inpatient. He does want to smoke also so he is considering dc to home as well. He has scored a 10 on the CIWA this afternoon and ativan is being given.    Current Principal Problem:  Alcohol abuse with withdrawal (HCC)     EtOH abuse  -CIWA protocol  - Social work consultation for referral for outpatient rehab services upon discharge    DVT Prophylaxis: Lovenox    Management discussed with RN,     Electronically signed

## 2024-08-11 NOTE — H&P
bilaterally.  Full range of motion without deformity.  Neurologic:  Neurovascularly intact without any focal sensory/motor deficits. Cranial nerves: II-XII intact, grossly non-focal.  Psychiatric:  Alert and oriented, thought content appropriate, normal insight  Skin:  Skin color, texture, turgor normal.  No rashes or lesions.  Capillary Refill:  Brisk,< 3 seconds   Peripheral Pulses:  +2 palpable, equal bilaterally     Diet: [x]Adult/General  []Cardiac  []Diabetic  []Low Fat  []NPO  []NPO after Midnight  []Other   DVT Prophylaxis: [x]PPx LMWH  []SQ Heparin  []IPC/SCDs  []Eliquis  []Xarelto  []Coumadin     Code status: [x]Full  []DNR/CCA  []Limited (DNR/CCA with Do Not Intubate)  []DNRCC  Surrogate Decision Maker:   PT/OT Eval Status:   []NOT yet ordered  []Ordered and Pending   []Seen with Recommendations for:  []Home independently  []Home w/ assist  []HHC  []SNF  []Acute Rehab    Anticipated Discharge Day/Date: 2-3 days    Anticipated Discharge Location: [x]Home  []HHC  []SNF  []Acute Rehab  []ECF  []LTAC  []Hospice    Consults:      IP CONSULT TO SOCIAL WORK  IP CONSULT TO SOCIAL WORK    I personally have obtained, updated and/or reviewed the patient’s medication list on 8/10/2024   --------------------------------------------------------------------------------------------------------------------------------------------------------------------    Imaging:     No results found.    PCP: Alexei Aguirre MD    Past Medical History:        Diagnosis Date    Bronchitis     Depression     ETOH abuse     Prostate enlargement        Past Surgical History:        Procedure Laterality Date    LEG SURGERY      hamstring left    TONSILLECTOMY         Medications Prior to Admission:   Prior to Admission medications    Medication Sig Start Date End Date Taking? Authorizing Provider   diazePAM (VALIUM) 5 MG tablet Take 1 tablet by mouth every 6 hours as needed for Anxiety. Max Daily Amount: 20 mg   Yes Provider,

## 2024-08-11 NOTE — FLOWSHEET NOTE
08/11/24 0800   Vital Signs   Temp 97.7 °F (36.5 °C)   Temp Source Oral   Pulse 71   Heart Rate Source Monitor   Respirations 18   BP (!) 157/92   MAP (Calculated) 114   BP Location Left upper arm   BP Method Automatic   Patient Position Semi fowlers   Pain Assessment   Pain Assessment None - Denies Pain   Opioid-Induced Sedation   POSS Score 1   Oxygen Therapy   SpO2 97 %   O2 Device None (Room air)     AM assessment completed, see flow sheet. Pt is alert and oriented. Vital signs are above. Respirations are even & easy. No complaints voiced. Pt denies needs at this time.  bed in low position. Call light is within reach.

## 2024-08-11 NOTE — PLAN OF CARE
Problem: Discharge Planning  Goal: Discharge to home or other facility with appropriate resources  8/10/2024 2259 by Betsy Ennis, RN  Outcome: Progressing  8/10/2024 1900 by Rhonda Pompa, RN  Outcome: Progressing  Flowsheets (Taken 8/10/2024 1848)  Discharge to home or other facility with appropriate resources: Identify barriers to discharge with patient and caregiver     Problem: Safety - Adult  Goal: Free from fall injury  8/10/2024 2259 by Betsy Ennis, RN  Outcome: Progressing  8/10/2024 1900 by Rhonda Pompa, RN  Outcome: Progressing

## 2024-08-11 NOTE — PLAN OF CARE
Problem: Discharge Planning  Goal: Discharge to home or other facility with appropriate resources  8/11/2024 0917 by Liliam Livingston, RN  Outcome: Progressing  8/10/2024 2259 by Betsy Ennis, RN  Outcome: Progressing     Problem: Safety - Adult  Goal: Free from fall injury  8/11/2024 0917 by Liliam Livingston, RN  Outcome: Progressing  8/10/2024 2259 by Betsy Ennis, RN  Outcome: Progressing

## 2025-01-01 ENCOUNTER — ANESTHESIA EVENT (OUTPATIENT)
Dept: ENDOSCOPY | Age: 55
DRG: 280 | End: 2025-01-01
Payer: COMMERCIAL

## 2025-01-01 ENCOUNTER — APPOINTMENT (OUTPATIENT)
Dept: GENERAL RADIOLOGY | Age: 55
DRG: 280 | End: 2025-01-01
Payer: COMMERCIAL

## 2025-01-01 ENCOUNTER — ANCILLARY PROCEDURE (OUTPATIENT)
Dept: EMERGENCY DEPT | Age: 55
DRG: 280 | End: 2025-01-01
Attending: STUDENT IN AN ORGANIZED HEALTH CARE EDUCATION/TRAINING PROGRAM
Payer: COMMERCIAL

## 2025-01-01 ENCOUNTER — APPOINTMENT (OUTPATIENT)
Dept: CT IMAGING | Age: 55
DRG: 280 | End: 2025-01-01
Payer: COMMERCIAL

## 2025-01-01 ENCOUNTER — HOSPITAL ENCOUNTER (INPATIENT)
Age: 55
LOS: 3 days | DRG: 280 | End: 2025-06-02
Attending: STUDENT IN AN ORGANIZED HEALTH CARE EDUCATION/TRAINING PROGRAM | Admitting: INTERNAL MEDICINE
Payer: COMMERCIAL

## 2025-01-01 ENCOUNTER — ANESTHESIA (OUTPATIENT)
Dept: ENDOSCOPY | Age: 55
DRG: 280 | End: 2025-01-01
Payer: COMMERCIAL

## 2025-01-01 VITALS
SYSTOLIC BLOOD PRESSURE: 92 MMHG | BODY MASS INDEX: 36.07 KG/M2 | HEIGHT: 70 IN | OXYGEN SATURATION: 63 % | WEIGHT: 251.99 LBS | TEMPERATURE: 99.2 F | RESPIRATION RATE: 26 BRPM | DIASTOLIC BLOOD PRESSURE: 68 MMHG

## 2025-01-01 DIAGNOSIS — I95.9 HYPOTENSION, UNSPECIFIED HYPOTENSION TYPE: Primary | ICD-10-CM

## 2025-01-01 DIAGNOSIS — R00.2 PALPITATIONS: ICD-10-CM

## 2025-01-01 DIAGNOSIS — R79.89 ELEVATED TROPONIN: ICD-10-CM

## 2025-01-01 DIAGNOSIS — K92.2 UPPER GI BLEED: ICD-10-CM

## 2025-01-01 DIAGNOSIS — A41.9 SEPSIS, DUE TO UNSPECIFIED ORGANISM, UNSPECIFIED WHETHER ACUTE ORGAN DYSFUNCTION PRESENT (HCC): ICD-10-CM

## 2025-01-01 DIAGNOSIS — R42 INTERMITTENT LIGHTHEADEDNESS: ICD-10-CM

## 2025-01-01 DIAGNOSIS — K92.0 HEMATEMESIS WITH NAUSEA: ICD-10-CM

## 2025-01-01 DIAGNOSIS — F13.10 BENZODIAZEPINE ABUSE (HCC): ICD-10-CM

## 2025-01-01 DIAGNOSIS — F10.930 ALCOHOL WITHDRAWAL SYNDROME WITHOUT COMPLICATION (HCC): ICD-10-CM

## 2025-01-01 DIAGNOSIS — K92.2 ACUTE GI BLEEDING: ICD-10-CM

## 2025-01-01 LAB
ABO/RH: NORMAL
ACANTHOCYTES BLD QL SMEAR: ABNORMAL
ACANTHOCYTES BLD QL SMEAR: ABNORMAL
ALBUMIN SERPL-MCNC: 2.2 G/DL (ref 3.4–5)
ALBUMIN SERPL-MCNC: 2.3 G/DL (ref 3.4–5)
ALBUMIN SERPL-MCNC: 2.6 G/DL (ref 3.4–5)
ALBUMIN SERPL-MCNC: 2.8 G/DL (ref 3.4–5)
ALBUMIN SERPL-MCNC: 3.3 G/DL (ref 3.4–5)
ALBUMIN/GLOB SERPL: 1 {RATIO} (ref 1.1–2.2)
ALBUMIN/GLOB SERPL: 1.3 {RATIO} (ref 1.1–2.2)
ALBUMIN/GLOB SERPL: 1.6 {RATIO} (ref 1.1–2.2)
ALBUMIN/GLOB SERPL: 1.8 {RATIO} (ref 1.1–2.2)
ALBUMIN/GLOB SERPL: 1.9 {RATIO} (ref 1.1–2.2)
ALBUMIN/GLOB SERPL: 1.9 {RATIO} (ref 1.1–2.2)
ALBUMIN/GLOB SERPL: 2.2 {RATIO} (ref 1.1–2.2)
ALP SERPL-CCNC: 1002 U/L (ref 40–129)
ALP SERPL-CCNC: 125 U/L (ref 40–129)
ALP SERPL-CCNC: 137 U/L (ref 40–129)
ALP SERPL-CCNC: 141 U/L (ref 40–129)
ALP SERPL-CCNC: 168 U/L (ref 40–129)
ALP SERPL-CCNC: 221 U/L (ref 40–129)
ALP SERPL-CCNC: 236 U/L (ref 40–129)
ALP SERPL-CCNC: 254 U/L (ref 40–129)
ALP SERPL-CCNC: 342 U/L (ref 40–129)
ALT SERPL-CCNC: 1214 U/L (ref 10–40)
ALT SERPL-CCNC: 1483 U/L (ref 10–40)
ALT SERPL-CCNC: 1612 U/L (ref 10–40)
ALT SERPL-CCNC: 200 U/L (ref 10–40)
ALT SERPL-CCNC: 2111 U/L (ref 10–40)
ALT SERPL-CCNC: 265 U/L (ref 10–40)
ALT SERPL-CCNC: 304 U/L (ref 10–40)
ALT SERPL-CCNC: 4194 U/L (ref 10–40)
ALT SERPL-CCNC: 955 U/L (ref 10–40)
AMPHETAMINES UR QL SCN>1000 NG/ML: ABNORMAL
AMYLASE SERPL-CCNC: 109 U/L (ref 25–115)
AMYLASE SERPL-CCNC: 47 U/L (ref 25–115)
ANION GAP SERPL CALCULATED.3IONS-SCNC: 19 MMOL/L (ref 3–16)
ANION GAP SERPL CALCULATED.3IONS-SCNC: 24 MMOL/L (ref 3–16)
ANION GAP SERPL CALCULATED.3IONS-SCNC: 27 MMOL/L (ref 3–16)
ANION GAP SERPL CALCULATED.3IONS-SCNC: 27 MMOL/L (ref 3–16)
ANION GAP SERPL CALCULATED.3IONS-SCNC: 28 MMOL/L (ref 3–16)
ANION GAP SERPL CALCULATED.3IONS-SCNC: 29 MMOL/L (ref 3–16)
ANION GAP SERPL CALCULATED.3IONS-SCNC: 30 MMOL/L (ref 3–16)
ANION GAP SERPL CALCULATED.3IONS-SCNC: 34 MMOL/L (ref 3–16)
ANION GAP SERPL CALCULATED.3IONS-SCNC: 35 MMOL/L (ref 3–16)
ANION GAP SERPL CALCULATED.3IONS-SCNC: 36 MMOL/L (ref 3–16)
ANION GAP SERPL CALCULATED.3IONS-SCNC: 40 MMOL/L (ref 3–16)
ANION GAP SERPL CALCULATED.3IONS-SCNC: 41 MMOL/L (ref 3–16)
ANISOCYTOSIS BLD QL SMEAR: ABNORMAL
ANISOCYTOSIS BLD QL SMEAR: ABNORMAL
ANTIBODY SCREEN: NORMAL
APAP SERPL-MCNC: <5 UG/ML (ref 10–30)
APTT BLD: 41.7 SEC (ref 22.1–36.4)
AST SERPL-CCNC: 162 U/L (ref 15–37)
AST SERPL-CCNC: 3298 U/L (ref 15–37)
AST SERPL-CCNC: 343 U/L (ref 15–37)
AST SERPL-CCNC: 4421 U/L (ref 15–37)
AST SERPL-CCNC: 5645 U/L (ref 15–37)
AST SERPL-CCNC: 6580 U/L (ref 15–37)
AST SERPL-CCNC: 670 U/L (ref 15–37)
AST SERPL-CCNC: >7000 U/L (ref 15–37)
AST SERPL-CCNC: >7000 U/L (ref 15–37)
BARBITURATES UR QL SCN>200 NG/ML: ABNORMAL
BASE EXCESS BLDA CALC-SCNC: -12.3 MMOL/L (ref -3–3)
BASE EXCESS BLDA CALC-SCNC: -15 MMOL/L (ref -3–3)
BASE EXCESS BLDA CALC-SCNC: -15.8 MMOL/L (ref -3–3)
BASE EXCESS BLDA CALC-SCNC: -21.5 MMOL/L (ref -3–3)
BASE EXCESS BLDA CALC-SCNC: -22.1 MMOL/L (ref -3–3)
BASE EXCESS BLDA CALC-SCNC: -22.4 MMOL/L (ref -3–3)
BASE EXCESS BLDA CALC-SCNC: -22.4 MMOL/L (ref -3–3)
BASE EXCESS BLDA CALC-SCNC: -23.4 MMOL/L (ref -3–3)
BASE EXCESS BLDA CALC-SCNC: -24 MMOL/L (ref -3–3)
BASE EXCESS BLDV CALC-SCNC: -16.1 MMOL/L (ref -3–3)
BASE EXCESS BLDV CALC-SCNC: 0.8 MMOL/L (ref -3–3)
BASOPHILS # BLD: 0 K/UL (ref 0–0.2)
BASOPHILS # BLD: 0.1 K/UL (ref 0–0.2)
BASOPHILS NFR BLD: 0 %
BASOPHILS NFR BLD: 1.4 %
BENZODIAZ UR QL SCN>200 NG/ML: POSITIVE
BILIRUB DIRECT SERPL-MCNC: 2.6 MG/DL (ref 0–0.3)
BILIRUB DIRECT SERPL-MCNC: 3.8 MG/DL (ref 0–0.3)
BILIRUB INDIRECT SERPL-MCNC: 0.6 MG/DL (ref 0–1)
BILIRUB INDIRECT SERPL-MCNC: 1 MG/DL (ref 0–1)
BILIRUB SERPL-MCNC: 3.2 MG/DL (ref 0–1)
BILIRUB SERPL-MCNC: 3.2 MG/DL (ref 0–1)
BILIRUB SERPL-MCNC: 3.8 MG/DL (ref 0–1)
BILIRUB SERPL-MCNC: 3.9 MG/DL (ref 0–1)
BILIRUB SERPL-MCNC: 4.4 MG/DL (ref 0–1)
BILIRUB SERPL-MCNC: 4.7 MG/DL (ref 0–1)
BILIRUB SERPL-MCNC: 4.8 MG/DL (ref 0–1)
BILIRUB SERPL-MCNC: 4.9 MG/DL (ref 0–1)
BILIRUB SERPL-MCNC: 5 MG/DL (ref 0–1)
BILIRUB UR QL STRIP.AUTO: ABNORMAL
BILIRUB UR QL STRIP.AUTO: NEGATIVE
BLOOD BANK DISPENSE STATUS: NORMAL
BLOOD BANK PRODUCT CODE: NORMAL
BPU ID: NORMAL
BUN SERPL-MCNC: 16 MG/DL (ref 7–20)
BUN SERPL-MCNC: 17 MG/DL (ref 7–20)
BUN SERPL-MCNC: 23 MG/DL (ref 7–20)
BUN SERPL-MCNC: 25 MG/DL (ref 7–20)
BUN SERPL-MCNC: 30 MG/DL (ref 7–20)
BUN SERPL-MCNC: 34 MG/DL (ref 7–20)
BUN SERPL-MCNC: 35 MG/DL (ref 7–20)
BUN SERPL-MCNC: 36 MG/DL (ref 7–20)
BUN SERPL-MCNC: 38 MG/DL (ref 7–20)
BUN SERPL-MCNC: 38 MG/DL (ref 7–20)
BUN SERPL-MCNC: 42 MG/DL (ref 7–20)
BUN SERPL-MCNC: 9 MG/DL (ref 7–20)
BURR CELLS BLD QL SMEAR: ABNORMAL
BURR CELLS BLD QL SMEAR: ABNORMAL
CA-I BLD-SCNC: 0.91 MMOL/L (ref 1.12–1.32)
CA-I BLD-SCNC: 0.92 MMOL/L (ref 1.12–1.32)
CA-I BLD-SCNC: 0.96 MMOL/L (ref 1.12–1.32)
CA-I BLD-SCNC: 0.97 MMOL/L (ref 1.12–1.32)
CA-I BLD-SCNC: 0.97 MMOL/L (ref 1.12–1.32)
CA-I BLD-SCNC: 1.01 MMOL/L (ref 1.12–1.32)
CALCIUM SERPL-MCNC: 6.3 MG/DL (ref 8.3–10.6)
CALCIUM SERPL-MCNC: 6.4 MG/DL (ref 8.3–10.6)
CALCIUM SERPL-MCNC: 6.4 MG/DL (ref 8.3–10.6)
CALCIUM SERPL-MCNC: 6.6 MG/DL (ref 8.3–10.6)
CALCIUM SERPL-MCNC: 6.7 MG/DL (ref 8.3–10.6)
CALCIUM SERPL-MCNC: 7 MG/DL (ref 8.3–10.6)
CALCIUM SERPL-MCNC: 7.1 MG/DL (ref 8.3–10.6)
CALCIUM SERPL-MCNC: 7.1 MG/DL (ref 8.3–10.6)
CALCIUM SERPL-MCNC: 7.2 MG/DL (ref 8.3–10.6)
CALCIUM SERPL-MCNC: 7.4 MG/DL (ref 8.3–10.6)
CALCIUM SERPL-MCNC: 7.4 MG/DL (ref 8.3–10.6)
CALCIUM SERPL-MCNC: 8 MG/DL (ref 8.3–10.6)
CANNABINOIDS UR QL SCN>50 NG/ML: ABNORMAL
CHLORIDE SERPL-SCNC: 101 MMOL/L (ref 99–110)
CHLORIDE SERPL-SCNC: 102 MMOL/L (ref 99–110)
CHLORIDE SERPL-SCNC: 103 MMOL/L (ref 99–110)
CHLORIDE SERPL-SCNC: 103 MMOL/L (ref 99–110)
CHLORIDE SERPL-SCNC: 91 MMOL/L (ref 99–110)
CHLORIDE SERPL-SCNC: 91 MMOL/L (ref 99–110)
CHLORIDE SERPL-SCNC: 92 MMOL/L (ref 99–110)
CHLORIDE SERPL-SCNC: 93 MMOL/L (ref 99–110)
CHLORIDE SERPL-SCNC: 94 MMOL/L (ref 99–110)
CHLORIDE SERPL-SCNC: 95 MMOL/L (ref 99–110)
CHLORIDE SERPL-SCNC: 97 MMOL/L (ref 99–110)
CHLORIDE SERPL-SCNC: 99 MMOL/L (ref 99–110)
CK SERPL-CCNC: 140 U/L (ref 39–308)
CLARITY UR: CLEAR
CLARITY UR: CLEAR
CO2 BLDA-SCNC: 11.5 MMOL/L
CO2 BLDA-SCNC: 11.7 MMOL/L
CO2 BLDA-SCNC: 17.8 MMOL/L
CO2 BLDA-SCNC: 6.2 MMOL/L
CO2 BLDA-SCNC: 6.7 MMOL/L
CO2 BLDA-SCNC: 6.8 MMOL/L
CO2 BLDA-SCNC: 6.8 MMOL/L
CO2 BLDA-SCNC: 6.9 MMOL/L
CO2 BLDA-SCNC: 7.5 MMOL/L
CO2 BLDV-SCNC: 16 MMOL/L
CO2 BLDV-SCNC: 23 MMOL/L
CO2 SERPL-SCNC: 10 MMOL/L (ref 21–32)
CO2 SERPL-SCNC: 13 MMOL/L (ref 21–32)
CO2 SERPL-SCNC: 14 MMOL/L (ref 21–32)
CO2 SERPL-SCNC: 21 MMOL/L (ref 21–32)
CO2 SERPL-SCNC: 5 MMOL/L (ref 21–32)
CO2 SERPL-SCNC: 6 MMOL/L (ref 21–32)
CO2 SERPL-SCNC: 6 MMOL/L (ref 21–32)
CO2 SERPL-SCNC: 7 MMOL/L (ref 21–32)
COCAINE UR QL SCN: ABNORMAL
COHGB MFR BLDA: 0.1 % (ref 0–1.5)
COHGB MFR BLDA: 0.2 % (ref 0–1.5)
COHGB MFR BLDA: 0.2 % (ref 0–1.5)
COHGB MFR BLDA: 0.3 % (ref 0–1.5)
COHGB MFR BLDA: 0.6 % (ref 0–1.5)
COHGB MFR BLDV: 1.1 % (ref 0–1.5)
COHGB MFR BLDV: 1.5 % (ref 0–1.5)
COLOR UR: YELLOW
COLOR UR: YELLOW
CREAT SERPL-MCNC: 1.2 MG/DL (ref 0.9–1.3)
CREAT SERPL-MCNC: 1.6 MG/DL (ref 0.9–1.3)
CREAT SERPL-MCNC: 1.9 MG/DL (ref 0.9–1.3)
CREAT SERPL-MCNC: 2.2 MG/DL (ref 0.9–1.3)
CREAT SERPL-MCNC: 2.2 MG/DL (ref 0.9–1.3)
CREAT SERPL-MCNC: 2.3 MG/DL (ref 0.9–1.3)
CREAT SERPL-MCNC: 2.5 MG/DL (ref 0.9–1.3)
CREAT SERPL-MCNC: 2.5 MG/DL (ref 0.9–1.3)
CREAT SERPL-MCNC: 2.8 MG/DL (ref 0.9–1.3)
CREAT SERPL-MCNC: 2.9 MG/DL (ref 0.9–1.3)
CREAT SERPL-MCNC: 2.9 MG/DL (ref 0.9–1.3)
CREAT SERPL-MCNC: 3.3 MG/DL (ref 0.9–1.3)
D-DIMER QUANTITATIVE: <0.27 UG/ML FEU (ref 0–0.6)
DACRYOCYTES BLD QL SMEAR: ABNORMAL
DEPRECATED RDW RBC AUTO: 16.4 % (ref 12.4–15.4)
DEPRECATED RDW RBC AUTO: 16.9 % (ref 12.4–15.4)
DEPRECATED RDW RBC AUTO: 17 % (ref 12.4–15.4)
DEPRECATED RDW RBC AUTO: 17.5 % (ref 12.4–15.4)
DESCRIPTION BLOOD BANK: NORMAL
DRUG SCREEN COMMENT UR-IMP: ABNORMAL
EKG ATRIAL RATE: 105 BPM
EKG ATRIAL RATE: 120 BPM
EKG DIAGNOSIS: NORMAL
EKG P AXIS: 58 DEGREES
EKG P AXIS: 73 DEGREES
EKG P-R INTERVAL: 142 MS
EKG P-R INTERVAL: 146 MS
EKG Q-T INTERVAL: 282 MS
EKG Q-T INTERVAL: 326 MS
EKG Q-T INTERVAL: 350 MS
EKG QRS DURATION: 68 MS
EKG QRS DURATION: 72 MS
EKG QRS DURATION: 80 MS
EKG QTC CALCULATION (BAZETT): 460 MS
EKG QTC CALCULATION (BAZETT): 462 MS
EKG QTC CALCULATION (BAZETT): 486 MS
EKG R AXIS: -71 DEGREES
EKG R AXIS: -73 DEGREES
EKG R AXIS: 270 DEGREES
EKG T AXIS: 4 DEGREES
EKG T AXIS: 41 DEGREES
EKG T AXIS: 59 DEGREES
EKG VENTRICULAR RATE: 105 BPM
EKG VENTRICULAR RATE: 120 BPM
EKG VENTRICULAR RATE: 179 BPM
EOSINOPHIL # BLD: 0 K/UL (ref 0–0.6)
EOSINOPHIL # BLD: 0 K/UL (ref 0–0.6)
EOSINOPHIL # BLD: 0.2 K/UL (ref 0–0.6)
EOSINOPHIL # BLD: 0.2 K/UL (ref 0–0.6)
EOSINOPHIL NFR BLD: 0 %
EOSINOPHIL NFR BLD: 0 %
EOSINOPHIL NFR BLD: 1 %
EOSINOPHIL NFR BLD: 1.8 %
EPI CELLS #/AREA URNS HPF: ABNORMAL /HPF (ref 0–5)
ETHANOLAMINE SERPL-MCNC: 21 MG/DL (ref 0–0.08)
FENTANYL SCREEN, URINE: ABNORMAL
FIBRINOGEN PPP-MCNC: 88 MG/DL (ref 227–534)
GFR SERPLBLD CREATININE-BSD FMLA CKD-EPI: 21 ML/MIN/{1.73_M2}
GFR SERPLBLD CREATININE-BSD FMLA CKD-EPI: 25 ML/MIN/{1.73_M2}
GFR SERPLBLD CREATININE-BSD FMLA CKD-EPI: 25 ML/MIN/{1.73_M2}
GFR SERPLBLD CREATININE-BSD FMLA CKD-EPI: 26 ML/MIN/{1.73_M2}
GFR SERPLBLD CREATININE-BSD FMLA CKD-EPI: 29 ML/MIN/{1.73_M2}
GFR SERPLBLD CREATININE-BSD FMLA CKD-EPI: 29 ML/MIN/{1.73_M2}
GFR SERPLBLD CREATININE-BSD FMLA CKD-EPI: 33 ML/MIN/{1.73_M2}
GFR SERPLBLD CREATININE-BSD FMLA CKD-EPI: 34 ML/MIN/{1.73_M2}
GFR SERPLBLD CREATININE-BSD FMLA CKD-EPI: 34 ML/MIN/{1.73_M2}
GFR SERPLBLD CREATININE-BSD FMLA CKD-EPI: 41 ML/MIN/{1.73_M2}
GFR SERPLBLD CREATININE-BSD FMLA CKD-EPI: 50 ML/MIN/{1.73_M2}
GFR SERPLBLD CREATININE-BSD FMLA CKD-EPI: 71 ML/MIN/{1.73_M2}
GLUCOSE BLD-MCNC: 100 MG/DL (ref 70–99)
GLUCOSE BLD-MCNC: 104 MG/DL (ref 70–99)
GLUCOSE BLD-MCNC: 108 MG/DL (ref 70–99)
GLUCOSE BLD-MCNC: 112 MG/DL (ref 70–99)
GLUCOSE BLD-MCNC: 116 MG/DL (ref 70–99)
GLUCOSE BLD-MCNC: 118 MG/DL (ref 70–99)
GLUCOSE BLD-MCNC: 125 MG/DL (ref 70–99)
GLUCOSE BLD-MCNC: 140 MG/DL (ref 70–99)
GLUCOSE BLD-MCNC: 168 MG/DL (ref 70–99)
GLUCOSE BLD-MCNC: 206 MG/DL (ref 70–99)
GLUCOSE BLD-MCNC: 262 MG/DL (ref 70–99)
GLUCOSE BLD-MCNC: 69 MG/DL (ref 70–99)
GLUCOSE BLD-MCNC: 72 MG/DL (ref 70–99)
GLUCOSE BLD-MCNC: 95 MG/DL (ref 70–99)
GLUCOSE BLD-MCNC: 99 MG/DL (ref 70–99)
GLUCOSE SERPL-MCNC: 103 MG/DL (ref 70–99)
GLUCOSE SERPL-MCNC: 103 MG/DL (ref 70–99)
GLUCOSE SERPL-MCNC: 104 MG/DL (ref 70–99)
GLUCOSE SERPL-MCNC: 130 MG/DL (ref 70–99)
GLUCOSE SERPL-MCNC: 161 MG/DL (ref 70–99)
GLUCOSE SERPL-MCNC: 64 MG/DL (ref 70–99)
GLUCOSE SERPL-MCNC: 71 MG/DL (ref 70–99)
GLUCOSE SERPL-MCNC: 81 MG/DL (ref 70–99)
GLUCOSE SERPL-MCNC: 87 MG/DL (ref 70–99)
GLUCOSE SERPL-MCNC: 88 MG/DL (ref 70–99)
GLUCOSE SERPL-MCNC: 94 MG/DL (ref 70–99)
GLUCOSE SERPL-MCNC: 96 MG/DL (ref 70–99)
GLUCOSE UR STRIP.AUTO-MCNC: NEGATIVE MG/DL
GLUCOSE UR STRIP.AUTO-MCNC: NEGATIVE MG/DL
HCO3 BLDA-SCNC: 10.7 MMOL/L (ref 21–29)
HCO3 BLDA-SCNC: 10.9 MMOL/L (ref 21–29)
HCO3 BLDA-SCNC: 16.3 MMOL/L (ref 21–29)
HCO3 BLDA-SCNC: 5.5 MMOL/L (ref 21–29)
HCO3 BLDA-SCNC: 5.9 MMOL/L (ref 21–29)
HCO3 BLDA-SCNC: 6.1 MMOL/L (ref 21–29)
HCO3 BLDA-SCNC: 6.2 MMOL/L (ref 21–29)
HCO3 BLDA-SCNC: 6.2 MMOL/L (ref 21–29)
HCO3 BLDA-SCNC: 6.8 MMOL/L (ref 21–29)
HCO3 BLDV-SCNC: 14.6 MMOL/L (ref 23–29)
HCO3 BLDV-SCNC: 22.2 MMOL/L (ref 23–29)
HCT VFR BLD AUTO: 18.7 % (ref 40.5–52.5)
HCT VFR BLD AUTO: 19.4 % (ref 40.5–52.5)
HCT VFR BLD AUTO: 19.7 % (ref 40.5–52.5)
HCT VFR BLD AUTO: 20.1 % (ref 40.5–52.5)
HCT VFR BLD AUTO: 21.3 % (ref 40.5–52.5)
HCT VFR BLD AUTO: 21.7 % (ref 40.5–52.5)
HCT VFR BLD AUTO: 21.8 % (ref 40.5–52.5)
HCT VFR BLD AUTO: 22.3 % (ref 40.5–52.5)
HCT VFR BLD AUTO: 23 % (ref 40.5–52.5)
HCT VFR BLD AUTO: 23.5 % (ref 40.5–52.5)
HCT VFR BLD AUTO: 23.7 % (ref 40.5–52.5)
HCT VFR BLD AUTO: 24.9 % (ref 40.5–52.5)
HCT VFR BLD AUTO: 25.1 % (ref 40.5–52.5)
HCT VFR BLD AUTO: 27.7 % (ref 40.5–52.5)
HCT VFR BLD AUTO: 34.9 % (ref 40.5–52.5)
HGB BLD-MCNC: 11.9 G/DL (ref 13.5–17.5)
HGB BLD-MCNC: 6.1 G/DL (ref 13.5–17.5)
HGB BLD-MCNC: 6.3 G/DL (ref 13.5–17.5)
HGB BLD-MCNC: 6.5 G/DL (ref 13.5–17.5)
HGB BLD-MCNC: 6.9 G/DL (ref 13.5–17.5)
HGB BLD-MCNC: 7 G/DL (ref 13.5–17.5)
HGB BLD-MCNC: 7.1 G/DL (ref 13.5–17.5)
HGB BLD-MCNC: 7.1 G/DL (ref 13.5–17.5)
HGB BLD-MCNC: 7.5 G/DL (ref 13.5–17.5)
HGB BLD-MCNC: 7.5 G/DL (ref 13.5–17.5)
HGB BLD-MCNC: 7.6 G/DL (ref 13.5–17.5)
HGB BLD-MCNC: 8.6 G/DL (ref 13.5–17.5)
HGB BLD-MCNC: 8.9 G/DL (ref 13.5–17.5)
HGB BLD-MCNC: 9 G/DL (ref 13.5–17.5)
HGB BLD-MCNC: 9.4 G/DL (ref 13.5–17.5)
HGB BLDA-MCNC: 6.4 G/DL (ref 13.5–17.5)
HGB BLDA-MCNC: 6.6 G/DL (ref 13.5–17.5)
HGB BLDA-MCNC: 6.7 G/DL (ref 13.5–17.5)
HGB BLDA-MCNC: 6.9 G/DL (ref 13.5–17.5)
HGB BLDA-MCNC: 7.2 G/DL (ref 13.5–17.5)
HGB BLDA-MCNC: 7.7 G/DL (ref 13.5–17.5)
HGB BLDA-MCNC: 8.1 G/DL (ref 13.5–17.5)
HGB BLDA-MCNC: 8.2 G/DL (ref 13.5–17.5)
HGB BLDA-MCNC: 8.2 G/DL (ref 13.5–17.5)
HGB UR QL STRIP.AUTO: NEGATIVE
HGB UR QL STRIP.AUTO: NEGATIVE
HYALINE CASTS #/AREA URNS LPF: ABNORMAL /LPF (ref 0–2)
HYPOCHROMIA BLD QL SMEAR: ABNORMAL
INR PPP: 1.13 (ref 0.85–1.15)
INR PPP: 2.46 (ref 0.85–1.15)
INR PPP: 4.12 (ref 0.85–1.15)
IRON SATN MFR SERPL: ABNORMAL % (ref 20–50)
IRON SERPL-MCNC: 159 UG/DL (ref 59–158)
KETONES UR STRIP.AUTO-MCNC: 15 MG/DL
KETONES UR STRIP.AUTO-MCNC: 15 MG/DL
LACTATE BLDV-SCNC: 11.2 MMOL/L (ref 0.4–2)
LACTATE BLDV-SCNC: 13.3 MMOL/L (ref 0.4–2)
LACTATE BLDV-SCNC: 14.3 MMOL/L (ref 0.4–2)
LACTATE BLDV-SCNC: 18 MMOL/L (ref 0.4–2)
LACTATE BLDV-SCNC: 18.1 MMOL/L (ref 0.4–2)
LACTATE BLDV-SCNC: 2.7 MMOL/L (ref 0.4–2)
LACTATE BLDV-SCNC: 22.8 MMOL/L (ref 0.4–2)
LACTATE BLDV-SCNC: 23.1 MMOL/L (ref 0.4–2)
LACTATE BLDV-SCNC: 24.5 MMOL/L (ref 0.4–2)
LACTATE BLDV-SCNC: 25.5 MMOL/L (ref 0.4–2)
LACTATE BLDV-SCNC: 27.5 MMOL/L (ref 0.4–2)
LACTATE BLDV-SCNC: 30.6 MMOL/L (ref 0.4–2)
LACTATE BLDV-SCNC: 4.7 MMOL/L (ref 0.4–2)
LACTATE BLDV-SCNC: 8.6 MMOL/L (ref 0.4–2)
LEUKOCYTE ESTERASE UR QL STRIP.AUTO: NEGATIVE
LEUKOCYTE ESTERASE UR QL STRIP.AUTO: NEGATIVE
LIPASE SERPL-CCNC: 130 U/L (ref 13–60)
LIPASE SERPL-CCNC: 35 U/L (ref 13–60)
LIPASE SERPL-CCNC: 37 U/L (ref 13–60)
LYMPHOCYTES # BLD: 1.4 K/UL (ref 1–5.1)
LYMPHOCYTES # BLD: 2.1 K/UL (ref 1–5.1)
LYMPHOCYTES # BLD: 2.2 K/UL (ref 1–5.1)
LYMPHOCYTES # BLD: 3 K/UL (ref 1–5.1)
LYMPHOCYTES NFR BLD: 10 %
LYMPHOCYTES NFR BLD: 34.3 %
LYMPHOCYTES NFR BLD: 5 %
LYMPHOCYTES NFR BLD: 9 %
MAGNESIUM SERPL-MCNC: 1.91 MG/DL (ref 1.8–2.4)
MAGNESIUM SERPL-MCNC: 1.95 MG/DL (ref 1.8–2.4)
MAGNESIUM SERPL-MCNC: 1.99 MG/DL (ref 1.8–2.4)
MAGNESIUM SERPL-MCNC: 2 MG/DL (ref 1.8–2.4)
MAGNESIUM SERPL-MCNC: 2.16 MG/DL (ref 1.8–2.4)
MAGNESIUM SERPL-MCNC: 2.35 MG/DL (ref 1.8–2.4)
MCH RBC QN AUTO: 27.6 PG (ref 26–34)
MCH RBC QN AUTO: 28.6 PG (ref 26–34)
MCH RBC QN AUTO: 28.8 PG (ref 26–34)
MCH RBC QN AUTO: 32.3 PG (ref 26–34)
MCHC RBC AUTO-ENTMCNC: 31.9 G/DL (ref 31–36)
MCHC RBC AUTO-ENTMCNC: 33.5 G/DL (ref 31–36)
MCHC RBC AUTO-ENTMCNC: 33.9 G/DL (ref 31–36)
MCHC RBC AUTO-ENTMCNC: 36 G/DL (ref 31–36)
MCV RBC AUTO: 81.4 FL (ref 80–100)
MCV RBC AUTO: 86 FL (ref 80–100)
MCV RBC AUTO: 89.7 FL (ref 80–100)
MCV RBC AUTO: 89.7 FL (ref 80–100)
METAMYELOCYTES NFR BLD MANUAL: 1 %
METHADONE UR QL SCN>300 NG/ML: ABNORMAL
METHGB MFR BLDA: 0 %
METHGB MFR BLDA: 0.3 %
METHGB MFR BLDA: 0.3 %
METHGB MFR BLDA: 0.4 %
METHGB MFR BLDA: 0.5 %
METHGB MFR BLDA: 0.6 %
METHGB MFR BLDA: 0.6 %
METHGB MFR BLDA: 0.7 %
METHGB MFR BLDA: 1 %
METHGB MFR BLDV: 0.1 %
METHGB MFR BLDV: 0.3 %
MONOCYTES # BLD: 0.2 K/UL (ref 0–1.3)
MONOCYTES # BLD: 0.2 K/UL (ref 0–1.3)
MONOCYTES # BLD: 0.6 K/UL (ref 0–1.3)
MONOCYTES # BLD: 0.7 K/UL (ref 0–1.3)
MONOCYTES NFR BLD: 1 %
MONOCYTES NFR BLD: 1 %
MONOCYTES NFR BLD: 2 %
MONOCYTES NFR BLD: 7.8 %
MONONUC CELLS NFR BLD MANUAL: 1 %
MUCOUS THREADS #/AREA URNS LPF: ABNORMAL /LPF
MYELOCYTES NFR BLD MANUAL: 1 %
NEUTROPHILS # BLD: 19.5 K/UL (ref 1.7–7.7)
NEUTROPHILS # BLD: 20.5 K/UL (ref 1.7–7.7)
NEUTROPHILS # BLD: 25.7 K/UL (ref 1.7–7.7)
NEUTROPHILS # BLD: 4.8 K/UL (ref 1.7–7.7)
NEUTROPHILS NFR BLD: 54.7 %
NEUTROPHILS NFR BLD: 85 %
NEUTROPHILS NFR BLD: 87 %
NEUTROPHILS NFR BLD: 92 %
NEUTS BAND NFR BLD MANUAL: 0 % (ref 0–7)
NEUTS BAND NFR BLD MANUAL: 2 % (ref 0–7)
NITRITE UR QL STRIP.AUTO: NEGATIVE
NITRITE UR QL STRIP.AUTO: NEGATIVE
NRBC BLD-RTO: 1 /100 WBC
NT-PROBNP SERPL-MCNC: <36 PG/ML (ref 0–124)
O2 CT VFR BLDV CALC: 12 VOL %
O2 CT VFR BLDV CALC: 16 VOL %
O2 THERAPY: ABNORMAL
OPIATES UR QL SCN>300 NG/ML: ABNORMAL
OVALOCYTES BLD QL SMEAR: ABNORMAL
OXYCODONE UR QL SCN: ABNORMAL
PATH INTERP BLD-IMP: NORMAL
PATH INTERP BLD-IMP: YES
PCO2 BLDA: 22 MMHG (ref 35–45)
PCO2 BLDA: 22.5 MMHG (ref 35–45)
PCO2 BLDA: 22.9 MMHG (ref 35–45)
PCO2 BLDA: 23.2 MMHG (ref 35–45)
PCO2 BLDA: 24.2 MMHG (ref 35–45)
PCO2 BLDA: 24.3 MMHG (ref 35–45)
PCO2 BLDA: 25.7 MMHG (ref 35–45)
PCO2 BLDA: 27.5 MMHG (ref 35–45)
PCO2 BLDA: 50.8 MMHG (ref 35–45)
PCO2 BLDV: 26.5 MMHG (ref 40–50)
PCO2 BLDV: 58.6 MMHG (ref 40–50)
PCP UR QL SCN>25 NG/ML: ABNORMAL
PERFORMED ON: ABNORMAL
PERFORMED ON: NORMAL
PH BLDA: 7 [PH] (ref 7.35–7.45)
PH BLDA: 7.01 [PH] (ref 7.35–7.45)
PH BLDA: 7.05 [PH] (ref 7.35–7.45)
PH BLDA: 7.05 [PH] (ref 7.35–7.45)
PH BLDA: 7.07 [PH] (ref 7.35–7.45)
PH BLDA: 7.07 [PH] (ref 7.35–7.45)
PH BLDA: 7.12 [PH] (ref 7.35–7.45)
PH BLDA: 7.21 [PH] (ref 7.35–7.45)
PH BLDA: 7.25 [PH] (ref 7.35–7.45)
PH BLDV: 6.99 [PH] (ref 7.35–7.45)
PH BLDV: 7.01 [PH] (ref 7.35–7.45)
PH BLDV: 7.08 [PH] (ref 7.35–7.45)
PH BLDV: 7.09 [PH] (ref 7.35–7.45)
PH BLDV: 7.13 [PH] (ref 7.35–7.45)
PH BLDV: 7.24 [PH] (ref 7.35–7.45)
PH BLDV: 7.27 [PH] (ref 7.35–7.45)
PH BLDV: 7.54 [PH] (ref 7.35–7.45)
PH UR STRIP.AUTO: 5.5 [PH] (ref 5–8)
PH UR STRIP.AUTO: 6.5 [PH] (ref 5–8)
PH UR STRIP: 6.5 [PH]
PHOSPHATE SERPL-MCNC: 4.4 MG/DL (ref 2.5–4.9)
PHOSPHATE SERPL-MCNC: 6.9 MG/DL (ref 2.5–4.9)
PHOSPHATE SERPL-MCNC: 7 MG/DL (ref 2.5–4.9)
PHOSPHATE SERPL-MCNC: 8 MG/DL (ref 2.5–4.9)
PHOSPHATE SERPL-MCNC: 8.2 MG/DL (ref 2.5–4.9)
PLATELET # BLD AUTO: 118 K/UL (ref 135–450)
PLATELET # BLD AUTO: 163 K/UL (ref 135–450)
PLATELET # BLD AUTO: 187 K/UL (ref 135–450)
PLATELET # BLD AUTO: 205 K/UL (ref 135–450)
PLATELET BLD QL SMEAR: ABNORMAL
PLATELET BLD QL SMEAR: ADEQUATE
PLATELET BLD QL SMEAR: ADEQUATE
PMV BLD AUTO: 8 FL (ref 5–10.5)
PMV BLD AUTO: 9.1 FL (ref 5–10.5)
PMV BLD AUTO: 9.2 FL (ref 5–10.5)
PMV BLD AUTO: 9.7 FL (ref 5–10.5)
PO2 BLDA: 109 MMHG (ref 75–108)
PO2 BLDA: 111.5 MMHG (ref 75–108)
PO2 BLDA: 134.3 MMHG (ref 75–108)
PO2 BLDA: 134.4 MMHG (ref 75–108)
PO2 BLDA: 165.3 MMHG (ref 75–108)
PO2 BLDA: 224 MMHG (ref 75–108)
PO2 BLDA: 233.2 MMHG (ref 75–108)
PO2 BLDA: 324.7 MMHG (ref 75–108)
PO2 BLDA: 395.4 MMHG (ref 75–108)
PO2 BLDV: 61.3 MMHG (ref 25–40)
PO2 BLDV: 69.3 MMHG (ref 25–40)
POIKILOCYTOSIS BLD QL SMEAR: ABNORMAL
POIKILOCYTOSIS BLD QL SMEAR: ABNORMAL
POLYCHROMASIA BLD QL SMEAR: ABNORMAL
POLYCHROMASIA BLD QL SMEAR: ABNORMAL
POTASSIUM SERPL-SCNC: 3.8 MMOL/L (ref 3.5–5.1)
POTASSIUM SERPL-SCNC: 5.2 MMOL/L (ref 3.5–5.1)
POTASSIUM SERPL-SCNC: 5.3 MMOL/L (ref 3.5–5.1)
POTASSIUM SERPL-SCNC: 5.3 MMOL/L (ref 3.5–5.1)
POTASSIUM SERPL-SCNC: 5.4 MMOL/L (ref 3.5–5.1)
POTASSIUM SERPL-SCNC: 5.5 MMOL/L (ref 3.5–5.1)
POTASSIUM SERPL-SCNC: 5.8 MMOL/L (ref 3.5–5.1)
POTASSIUM SERPL-SCNC: 5.8 MMOL/L (ref 3.5–5.1)
POTASSIUM SERPL-SCNC: 6.2 MMOL/L (ref 3.5–5.1)
POTASSIUM SERPL-SCNC: 6.6 MMOL/L (ref 3.5–5.1)
POTASSIUM SERPL-SCNC: 6.6 MMOL/L (ref 3.5–5.1)
POTASSIUM SERPL-SCNC: 7.8 MMOL/L (ref 3.5–5.1)
PROCALCITONIN SERPL IA-MCNC: 0.62 NG/ML (ref 0–0.15)
PROT SERPL-MCNC: 3.5 G/DL (ref 6.4–8.2)
PROT SERPL-MCNC: 3.8 G/DL (ref 6.4–8.2)
PROT SERPL-MCNC: 4 G/DL (ref 6.4–8.2)
PROT SERPL-MCNC: 4.2 G/DL (ref 6.4–8.2)
PROT SERPL-MCNC: 4.3 G/DL (ref 6.4–8.2)
PROT SERPL-MCNC: 4.4 G/DL (ref 6.4–8.2)
PROT SERPL-MCNC: 5.8 G/DL (ref 6.4–8.2)
PROT UR STRIP.AUTO-MCNC: ABNORMAL MG/DL
PROT UR STRIP.AUTO-MCNC: NEGATIVE MG/DL
PROTHROMBIN TIME: 14.7 SEC (ref 11.9–14.9)
PROTHROMBIN TIME: 26.6 SEC (ref 11.9–14.9)
PROTHROMBIN TIME: 39.5 SEC (ref 11.9–14.9)
RBC # BLD AUTO: 2.19 M/UL (ref 4.2–5.9)
RBC # BLD AUTO: 2.48 M/UL (ref 4.2–5.9)
RBC # BLD AUTO: 2.77 M/UL (ref 4.2–5.9)
RBC # BLD AUTO: 4.29 M/UL (ref 4.2–5.9)
RBC #/AREA URNS HPF: ABNORMAL /HPF (ref 0–4)
SALICYLATES SERPL-MCNC: <0.5 MG/DL (ref 15–30)
SAO2 % BLDA: 95.1 %
SAO2 % BLDA: 96.4 %
SAO2 % BLDA: 98.2 %
SAO2 % BLDA: 98.3 %
SAO2 % BLDA: 98.4 %
SAO2 % BLDA: 99.1 %
SAO2 % BLDA: 99.1 %
SAO2 % BLDA: 99.5 %
SAO2 % BLDA: 99.7 %
SAO2 % BLDV: 83 %
SAO2 % BLDV: 94 %
SCHISTOCYTES BLD QL SMEAR: ABNORMAL
SCHISTOCYTES BLD QL SMEAR: ABNORMAL
SLIDE REVIEW: ABNORMAL
SMUDGE CELLS BLD QL SMEAR: PRESENT
SODIUM SERPL-SCNC: 128 MMOL/L (ref 136–145)
SODIUM SERPL-SCNC: 132 MMOL/L (ref 136–145)
SODIUM SERPL-SCNC: 133 MMOL/L (ref 136–145)
SODIUM SERPL-SCNC: 136 MMOL/L (ref 136–145)
SODIUM SERPL-SCNC: 138 MMOL/L (ref 136–145)
SODIUM SERPL-SCNC: 138 MMOL/L (ref 136–145)
SODIUM SERPL-SCNC: 139 MMOL/L (ref 136–145)
SODIUM SERPL-SCNC: 140 MMOL/L (ref 136–145)
SODIUM SERPL-SCNC: 141 MMOL/L (ref 136–145)
SODIUM SERPL-SCNC: 142 MMOL/L (ref 136–145)
SP GR UR STRIP.AUTO: 1.01 (ref 1–1.03)
SP GR UR STRIP.AUTO: 1.02 (ref 1–1.03)
TIBC SERPL-MCNC: ABNORMAL UG/DL (ref 260–445)
TOXIC GRANULES BLD QL SMEAR: PRESENT
TROPONIN, HIGH SENSITIVITY: 26 NG/L (ref 0–22)
TROPONIN, HIGH SENSITIVITY: 33 NG/L (ref 0–22)
TROPONIN, HIGH SENSITIVITY: 37 NG/L (ref 0–22)
TROPONIN, HIGH SENSITIVITY: 38 NG/L (ref 0–22)
TROPONIN, HIGH SENSITIVITY: 42 NG/L (ref 0–22)
UA COMPLETE W REFLEX CULTURE PNL UR: ABNORMAL
UA COMPLETE W REFLEX CULTURE PNL UR: ABNORMAL
UA DIPSTICK W REFLEX MICRO PNL UR: ABNORMAL
UA DIPSTICK W REFLEX MICRO PNL UR: YES
URN SPEC COLLECT METH UR: ABNORMAL
URN SPEC COLLECT METH UR: ABNORMAL
UROBILINOGEN UR STRIP-ACNC: 0.2 E.U./DL
UROBILINOGEN UR STRIP-ACNC: 1 E.U./DL
VANCOMYCIN SERPL-MCNC: 16.8 UG/ML
VANCOMYCIN TROUGH SERPL-MCNC: 8.4 UG/ML (ref 10–20)
WBC # BLD AUTO: 22.4 K/UL (ref 4–11)
WBC # BLD AUTO: 22.8 K/UL (ref 4–11)
WBC # BLD AUTO: 27.6 K/UL (ref 4–11)
WBC # BLD AUTO: 8.8 K/UL (ref 4–11)
WBC #/AREA URNS HPF: ABNORMAL /HPF (ref 0–5)

## 2025-01-01 PROCEDURE — 82330 ASSAY OF CALCIUM: CPT

## 2025-01-01 PROCEDURE — 80202 ASSAY OF VANCOMYCIN: CPT

## 2025-01-01 PROCEDURE — 03HY32Z INSERTION OF MONITORING DEVICE INTO UPPER ARTERY, PERCUTANEOUS APPROACH: ICD-10-PCS | Performed by: STUDENT IN AN ORGANIZED HEALTH CARE EDUCATION/TRAINING PROGRAM

## 2025-01-01 PROCEDURE — 2700000000 HC OXYGEN THERAPY PER DAY

## 2025-01-01 PROCEDURE — 85014 HEMATOCRIT: CPT

## 2025-01-01 PROCEDURE — 71260 CT THORAX DX C+: CPT

## 2025-01-01 PROCEDURE — 80179 DRUG ASSAY SALICYLATE: CPT

## 2025-01-01 PROCEDURE — 2580000003 HC RX 258: Performed by: STUDENT IN AN ORGANIZED HEALTH CARE EDUCATION/TRAINING PROGRAM

## 2025-01-01 PROCEDURE — 2500000003 HC RX 250 WO HCPCS: Performed by: STUDENT IN AN ORGANIZED HEALTH CARE EDUCATION/TRAINING PROGRAM

## 2025-01-01 PROCEDURE — 94003 VENT MGMT INPAT SUBQ DAY: CPT

## 2025-01-01 PROCEDURE — 2000000000 HC ICU R&B

## 2025-01-01 PROCEDURE — 5A1945Z RESPIRATORY VENTILATION, 24-96 CONSECUTIVE HOURS: ICD-10-PCS | Performed by: INTERNAL MEDICINE

## 2025-01-01 PROCEDURE — 82803 BLOOD GASES ANY COMBINATION: CPT

## 2025-01-01 PROCEDURE — 85025 COMPLETE CBC W/AUTO DIFF WBC: CPT

## 2025-01-01 PROCEDURE — 6360000002 HC RX W HCPCS: Performed by: INTERNAL MEDICINE

## 2025-01-01 PROCEDURE — 74018 RADEX ABDOMEN 1 VIEW: CPT

## 2025-01-01 PROCEDURE — 3E043XZ INTRODUCTION OF VASOPRESSOR INTO CENTRAL VEIN, PERCUTANEOUS APPROACH: ICD-10-PCS | Performed by: INTERNAL MEDICINE

## 2025-01-01 PROCEDURE — 3609013800 HC EGD SUBMUCOSAL/BOTOX INJECTION: Performed by: INTERNAL MEDICINE

## 2025-01-01 PROCEDURE — P9047 ALBUMIN (HUMAN), 25%, 50ML: HCPCS | Performed by: STUDENT IN AN ORGANIZED HEALTH CARE EDUCATION/TRAINING PROGRAM

## 2025-01-01 PROCEDURE — 2500000003 HC RX 250 WO HCPCS: Performed by: INTERNAL MEDICINE

## 2025-01-01 PROCEDURE — 02HV33Z INSERTION OF INFUSION DEVICE INTO SUPERIOR VENA CAVA, PERCUTANEOUS APPROACH: ICD-10-PCS | Performed by: STUDENT IN AN ORGANIZED HEALTH CARE EDUCATION/TRAINING PROGRAM

## 2025-01-01 PROCEDURE — 83735 ASSAY OF MAGNESIUM: CPT

## 2025-01-01 PROCEDURE — 82150 ASSAY OF AMYLASE: CPT

## 2025-01-01 PROCEDURE — 86850 RBC ANTIBODY SCREEN: CPT

## 2025-01-01 PROCEDURE — 4A133B1 MONITORING OF ARTERIAL PRESSURE, PERIPHERAL, PERCUTANEOUS APPROACH: ICD-10-PCS | Performed by: INTERNAL MEDICINE

## 2025-01-01 PROCEDURE — 81001 URINALYSIS AUTO W/SCOPE: CPT

## 2025-01-01 PROCEDURE — 96374 THER/PROPH/DIAG INJ IV PUSH: CPT

## 2025-01-01 PROCEDURE — 2580000003 HC RX 258: Performed by: INTERNAL MEDICINE

## 2025-01-01 PROCEDURE — 85018 HEMOGLOBIN: CPT

## 2025-01-01 PROCEDURE — 83550 IRON BINDING TEST: CPT

## 2025-01-01 PROCEDURE — 99233 SBSQ HOSP IP/OBS HIGH 50: CPT | Performed by: INTERNAL MEDICINE

## 2025-01-01 PROCEDURE — 84100 ASSAY OF PHOSPHORUS: CPT

## 2025-01-01 PROCEDURE — 71045 X-RAY EXAM CHEST 1 VIEW: CPT

## 2025-01-01 PROCEDURE — 36556 INSERT NON-TUNNEL CV CATH: CPT

## 2025-01-01 PROCEDURE — 94002 VENT MGMT INPAT INIT DAY: CPT

## 2025-01-01 PROCEDURE — 83605 ASSAY OF LACTIC ACID: CPT

## 2025-01-01 PROCEDURE — 85610 PROTHROMBIN TIME: CPT

## 2025-01-01 PROCEDURE — 51702 INSERT TEMP BLADDER CATH: CPT

## 2025-01-01 PROCEDURE — 82077 ASSAY SPEC XCP UR&BREATH IA: CPT

## 2025-01-01 PROCEDURE — 2709999900 HC NON-CHARGEABLE SUPPLY: Performed by: INTERNAL MEDICINE

## 2025-01-01 PROCEDURE — 83690 ASSAY OF LIPASE: CPT

## 2025-01-01 PROCEDURE — 2720000010 HC SURG SUPPLY STERILE: Performed by: INTERNAL MEDICINE

## 2025-01-01 PROCEDURE — 4A133J1 MONITORING OF ARTERIAL PULSE, PERIPHERAL, PERCUTANEOUS APPROACH: ICD-10-PCS | Performed by: INTERNAL MEDICINE

## 2025-01-01 PROCEDURE — 3E0G8GC INTRODUCTION OF OTHER THERAPEUTIC SUBSTANCE INTO UPPER GI, VIA NATURAL OR ARTIFICIAL OPENING ENDOSCOPIC: ICD-10-PCS | Performed by: INTERNAL MEDICINE

## 2025-01-01 PROCEDURE — 6360000004 HC RX CONTRAST MEDICATION: Performed by: STUDENT IN AN ORGANIZED HEALTH CARE EDUCATION/TRAINING PROGRAM

## 2025-01-01 PROCEDURE — 36556 INSERT NON-TUNNEL CV CATH: CPT | Performed by: INTERNAL MEDICINE

## 2025-01-01 PROCEDURE — 36430 TRANSFUSION BLD/BLD COMPNT: CPT

## 2025-01-01 PROCEDURE — 85384 FIBRINOGEN ACTIVITY: CPT

## 2025-01-01 PROCEDURE — 84484 ASSAY OF TROPONIN QUANT: CPT

## 2025-01-01 PROCEDURE — 81003 URINALYSIS AUTO W/O SCOPE: CPT

## 2025-01-01 PROCEDURE — 6370000000 HC RX 637 (ALT 250 FOR IP): Performed by: INTERNAL MEDICINE

## 2025-01-01 PROCEDURE — 80053 COMPREHEN METABOLIC PANEL: CPT

## 2025-01-01 PROCEDURE — 2580000003 HC RX 258: Performed by: PHYSICIAN ASSISTANT

## 2025-01-01 PROCEDURE — 70450 CT HEAD/BRAIN W/O DYE: CPT

## 2025-01-01 PROCEDURE — 6360000004 HC RX CONTRAST MEDICATION: Performed by: INTERNAL MEDICINE

## 2025-01-01 PROCEDURE — 86901 BLOOD TYPING SEROLOGIC RH(D): CPT

## 2025-01-01 PROCEDURE — 99292 CRITICAL CARE ADDL 30 MIN: CPT | Performed by: INTERNAL MEDICINE

## 2025-01-01 PROCEDURE — 36592 COLLECT BLOOD FROM PICC: CPT

## 2025-01-01 PROCEDURE — 94761 N-INVAS EAR/PLS OXIMETRY MLT: CPT

## 2025-01-01 PROCEDURE — 85730 THROMBOPLASTIN TIME PARTIAL: CPT

## 2025-01-01 PROCEDURE — 4A133J1 MONITORING OF ARTERIAL PULSE, PERIPHERAL, PERCUTANEOUS APPROACH: ICD-10-PCS | Performed by: STUDENT IN AN ORGANIZED HEALTH CARE EDUCATION/TRAINING PROGRAM

## 2025-01-01 PROCEDURE — 3609013000 HC EGD TRANSORAL CONTROL BLEEDING ANY METHOD: Performed by: INTERNAL MEDICINE

## 2025-01-01 PROCEDURE — 84145 PROCALCITONIN (PCT): CPT

## 2025-01-01 PROCEDURE — 6370000000 HC RX 637 (ALT 250 FOR IP): Performed by: STUDENT IN AN ORGANIZED HEALTH CARE EDUCATION/TRAINING PROGRAM

## 2025-01-01 PROCEDURE — 6360000002 HC RX W HCPCS: Performed by: STUDENT IN AN ORGANIZED HEALTH CARE EDUCATION/TRAINING PROGRAM

## 2025-01-01 PROCEDURE — 04HY32Z INSERTION OF MONITORING DEVICE INTO LOWER ARTERY, PERCUTANEOUS APPROACH: ICD-10-PCS | Performed by: INTERNAL MEDICINE

## 2025-01-01 PROCEDURE — 36415 COLL VENOUS BLD VENIPUNCTURE: CPT

## 2025-01-01 PROCEDURE — P9016 RBC LEUKOCYTES REDUCED: HCPCS

## 2025-01-01 PROCEDURE — 83880 ASSAY OF NATRIURETIC PEPTIDE: CPT

## 2025-01-01 PROCEDURE — 93308 TTE F-UP OR LMTD: CPT

## 2025-01-01 PROCEDURE — 6A550Z2 PHERESIS OF PLATELETS, SINGLE: ICD-10-PCS | Performed by: INTERNAL MEDICINE

## 2025-01-01 PROCEDURE — 2500000003 HC RX 250 WO HCPCS

## 2025-01-01 PROCEDURE — 0D9670Z DRAINAGE OF STOMACH WITH DRAINAGE DEVICE, VIA NATURAL OR ARTIFICIAL OPENING: ICD-10-PCS | Performed by: INTERNAL MEDICINE

## 2025-01-01 PROCEDURE — 76705 ECHO EXAM OF ABDOMEN: CPT

## 2025-01-01 PROCEDURE — 30233N1 TRANSFUSION OF NONAUTOLOGOUS RED BLOOD CELLS INTO PERIPHERAL VEIN, PERCUTANEOUS APPROACH: ICD-10-PCS | Performed by: INTERNAL MEDICINE

## 2025-01-01 PROCEDURE — 96361 HYDRATE IV INFUSION ADD-ON: CPT

## 2025-01-01 PROCEDURE — 83540 ASSAY OF IRON: CPT

## 2025-01-01 PROCEDURE — P9017 PLASMA 1 DONOR FRZ W/IN 8 HR: HCPCS

## 2025-01-01 PROCEDURE — 99291 CRITICAL CARE FIRST HOUR: CPT | Performed by: INTERNAL MEDICINE

## 2025-01-01 PROCEDURE — 93005 ELECTROCARDIOGRAM TRACING: CPT | Performed by: PHYSICIAN ASSISTANT

## 2025-01-01 PROCEDURE — 93005 ELECTROCARDIOGRAM TRACING: CPT | Performed by: STUDENT IN AN ORGANIZED HEALTH CARE EDUCATION/TRAINING PROGRAM

## 2025-01-01 PROCEDURE — 80307 DRUG TEST PRSMV CHEM ANLYZR: CPT

## 2025-01-01 PROCEDURE — 02HV33Z INSERTION OF INFUSION DEVICE INTO SUPERIOR VENA CAVA, PERCUTANEOUS APPROACH: ICD-10-PCS | Performed by: INTERNAL MEDICINE

## 2025-01-01 PROCEDURE — 86900 BLOOD TYPING SEROLOGIC ABO: CPT

## 2025-01-01 PROCEDURE — 36620 INSERTION CATHETER ARTERY: CPT | Performed by: INTERNAL MEDICINE

## 2025-01-01 PROCEDURE — 0DJ08ZZ INSPECTION OF UPPER INTESTINAL TRACT, VIA NATURAL OR ARTIFICIAL OPENING ENDOSCOPIC: ICD-10-PCS | Performed by: INTERNAL MEDICINE

## 2025-01-01 PROCEDURE — P9035 PLATELET PHERES LEUKOREDUCED: HCPCS

## 2025-01-01 PROCEDURE — 31500 INSERT EMERGENCY AIRWAY: CPT | Performed by: INTERNAL MEDICINE

## 2025-01-01 PROCEDURE — 4A133B1 MONITORING OF ARTERIAL PRESSURE, PERIPHERAL, PERCUTANEOUS APPROACH: ICD-10-PCS | Performed by: STUDENT IN AN ORGANIZED HEALTH CARE EDUCATION/TRAINING PROGRAM

## 2025-01-01 PROCEDURE — 6360000002 HC RX W HCPCS: Performed by: PHYSICIAN ASSISTANT

## 2025-01-01 PROCEDURE — 30233L1 TRANSFUSION OF NONAUTOLOGOUS FRESH PLASMA INTO PERIPHERAL VEIN, PERCUTANEOUS APPROACH: ICD-10-PCS | Performed by: INTERNAL MEDICINE

## 2025-01-01 PROCEDURE — 93010 ELECTROCARDIOGRAM REPORT: CPT | Performed by: INTERNAL MEDICINE

## 2025-01-01 PROCEDURE — 36620 INSERTION CATHETER ARTERY: CPT

## 2025-01-01 PROCEDURE — 30233K1 TRANSFUSION OF NONAUTOLOGOUS FROZEN PLASMA INTO PERIPHERAL VEIN, PERCUTANEOUS APPROACH: ICD-10-PCS | Performed by: INTERNAL MEDICINE

## 2025-01-01 PROCEDURE — 6370000000 HC RX 637 (ALT 250 FOR IP)

## 2025-01-01 PROCEDURE — 85379 FIBRIN DEGRADATION QUANT: CPT

## 2025-01-01 PROCEDURE — 99285 EMERGENCY DEPT VISIT HI MDM: CPT

## 2025-01-01 PROCEDURE — 3E033XZ INTRODUCTION OF VASOPRESSOR INTO PERIPHERAL VEIN, PERCUTANEOUS APPROACH: ICD-10-PCS | Performed by: INTERNAL MEDICINE

## 2025-01-01 PROCEDURE — 80143 DRUG ASSAY ACETAMINOPHEN: CPT

## 2025-01-01 PROCEDURE — 87040 BLOOD CULTURE FOR BACTERIA: CPT

## 2025-01-01 PROCEDURE — 74177 CT ABD & PELVIS W/CONTRAST: CPT

## 2025-01-01 PROCEDURE — 82550 ASSAY OF CK (CPK): CPT

## 2025-01-01 PROCEDURE — 86923 COMPATIBILITY TEST ELECTRIC: CPT

## 2025-01-01 PROCEDURE — 5A1D90Z PERFORMANCE OF URINARY FILTRATION, CONTINUOUS, GREATER THAN 18 HOURS PER DAY: ICD-10-PCS | Performed by: INTERNAL MEDICINE

## 2025-01-01 PROCEDURE — 0BH17EZ INSERTION OF ENDOTRACHEAL AIRWAY INTO TRACHEA, VIA NATURAL OR ARTIFICIAL OPENING: ICD-10-PCS | Performed by: INTERNAL MEDICINE

## 2025-01-01 PROCEDURE — 74174 CTA ABD&PLVS W/CONTRAST: CPT

## 2025-01-01 RX ORDER — CALCIUM GLUCONATE 20 MG/ML
2000 INJECTION, SOLUTION INTRAVENOUS PRN
Status: DISCONTINUED | OUTPATIENT
Start: 2025-01-01 | End: 2025-01-01 | Stop reason: HOSPADM

## 2025-01-01 RX ORDER — SODIUM CHLORIDE 0.9 % (FLUSH) 0.9 %
5-40 SYRINGE (ML) INJECTION PRN
Status: CANCELLED | OUTPATIENT
Start: 2025-01-01

## 2025-01-01 RX ORDER — TRAZODONE HYDROCHLORIDE 50 MG/1
50 TABLET ORAL NIGHTLY
COMMUNITY

## 2025-01-01 RX ORDER — DEXMEDETOMIDINE HYDROCHLORIDE 4 UG/ML
.1-1.5 INJECTION, SOLUTION INTRAVENOUS CONTINUOUS
Status: DISCONTINUED | OUTPATIENT
Start: 2025-01-01 | End: 2025-01-01 | Stop reason: HOSPADM

## 2025-01-01 RX ORDER — ALBUMIN (HUMAN) 12.5 G/50ML
50 SOLUTION INTRAVENOUS ONCE
Status: COMPLETED | OUTPATIENT
Start: 2025-01-01 | End: 2025-01-01

## 2025-01-01 RX ORDER — FENTANYL CITRATE-0.9 % NACL/PF 10 MCG/ML
25-200 PLASTIC BAG, INJECTION (ML) INTRAVENOUS CONTINUOUS
Refills: 0 | Status: DISCONTINUED | OUTPATIENT
Start: 2025-01-01 | End: 2025-01-01 | Stop reason: HOSPADM

## 2025-01-01 RX ORDER — INDOMETHACIN 25 MG/1
50 CAPSULE ORAL ONCE
Status: COMPLETED | OUTPATIENT
Start: 2025-01-01 | End: 2025-01-01

## 2025-01-01 RX ORDER — INDOMETHACIN 25 MG/1
CAPSULE ORAL
Status: COMPLETED
Start: 2025-01-01 | End: 2025-01-01

## 2025-01-01 RX ORDER — PANTOPRAZOLE SODIUM 40 MG/10ML
40 INJECTION, POWDER, LYOPHILIZED, FOR SOLUTION INTRAVENOUS ONCE
Status: COMPLETED | OUTPATIENT
Start: 2025-01-01 | End: 2025-01-01

## 2025-01-01 RX ORDER — PHENOBARBITAL SODIUM 65 MG/ML
32.5 INJECTION, SOLUTION INTRAMUSCULAR; INTRAVENOUS EVERY 12 HOURS
Status: DISCONTINUED | OUTPATIENT
Start: 2025-01-01 | End: 2025-01-01

## 2025-01-01 RX ORDER — NOREPINEPHRINE BITARTRATE 0.06 MG/ML
1-100 INJECTION, SOLUTION INTRAVENOUS CONTINUOUS
Status: DISCONTINUED | OUTPATIENT
Start: 2025-01-01 | End: 2025-01-01 | Stop reason: HOSPADM

## 2025-01-01 RX ORDER — PHENOBARBITAL SODIUM 65 MG/ML
32.5 INJECTION, SOLUTION INTRAMUSCULAR; INTRAVENOUS EVERY 6 HOURS
Status: DISCONTINUED | OUTPATIENT
Start: 2025-01-01 | End: 2025-01-01

## 2025-01-01 RX ORDER — DEXTROSE MONOHYDRATE 100 MG/ML
INJECTION, SOLUTION INTRAVENOUS CONTINUOUS PRN
Status: DISCONTINUED | OUTPATIENT
Start: 2025-01-01 | End: 2025-01-01 | Stop reason: HOSPADM

## 2025-01-01 RX ORDER — SODIUM CHLORIDE 0.9 % (FLUSH) 0.9 %
5-40 SYRINGE (ML) INJECTION EVERY 12 HOURS SCHEDULED
Status: DISCONTINUED | OUTPATIENT
Start: 2025-01-01 | End: 2025-01-01

## 2025-01-01 RX ORDER — BUPROPION HYDROCHLORIDE 75 MG/1
TABLET ORAL
COMMUNITY
Start: 2025-01-01

## 2025-01-01 RX ORDER — ETOMIDATE 2 MG/ML
10 INJECTION INTRAVENOUS ONCE
Status: COMPLETED | OUTPATIENT
Start: 2025-01-01 | End: 2025-01-01

## 2025-01-01 RX ORDER — IOPAMIDOL 755 MG/ML
75 INJECTION, SOLUTION INTRAVASCULAR
Status: COMPLETED | OUTPATIENT
Start: 2025-01-01 | End: 2025-01-01

## 2025-01-01 RX ORDER — SODIUM CHLORIDE 9 MG/ML
INJECTION, SOLUTION INTRAVENOUS PRN
Status: DISCONTINUED | OUTPATIENT
Start: 2025-01-01 | End: 2025-01-01 | Stop reason: HOSPADM

## 2025-01-01 RX ORDER — FENTANYL CITRATE 50 UG/ML
25 INJECTION, SOLUTION INTRAMUSCULAR; INTRAVENOUS ONCE
Refills: 0 | Status: COMPLETED | OUTPATIENT
Start: 2025-01-01 | End: 2025-01-01

## 2025-01-01 RX ORDER — ENOXAPARIN SODIUM 100 MG/ML
30 INJECTION SUBCUTANEOUS 2 TIMES DAILY
Status: CANCELLED | OUTPATIENT
Start: 2025-01-01

## 2025-01-01 RX ORDER — HEPARIN SODIUM 1000 [USP'U]/ML
INJECTION, SOLUTION INTRAVENOUS; SUBCUTANEOUS PRN
Status: DISCONTINUED | OUTPATIENT
Start: 2025-01-01 | End: 2025-01-01 | Stop reason: SDUPTHER

## 2025-01-01 RX ORDER — ALBUMIN (HUMAN) 12.5 G/50ML
SOLUTION INTRAVENOUS
Status: DISPENSED
Start: 2025-01-01 | End: 2025-01-01

## 2025-01-01 RX ORDER — CALCIUM CHLORIDE, MAGNESIUM CHLORIDE, DEXTROSE MONOHYDRATE, LACTIC ACID, SODIUM CHLORIDE, SODIUM BICARBONATE AND POTASSIUM CHLORIDE 5.15; 2.03; 22; 5.4; 6.46; 3.09; .157 G/L; G/L; G/L; G/L; G/L; G/L; G/L
500 INJECTION INTRAVENOUS CONTINUOUS
Status: DISCONTINUED | OUTPATIENT
Start: 2025-01-01 | End: 2025-01-01

## 2025-01-01 RX ORDER — SODIUM CHLORIDE 9 MG/ML
INJECTION, SOLUTION INTRAVENOUS CONTINUOUS
Status: DISCONTINUED | OUTPATIENT
Start: 2025-01-01 | End: 2025-01-01

## 2025-01-01 RX ORDER — 0.9 % SODIUM CHLORIDE 0.9 %
1000 INTRAVENOUS SOLUTION INTRAVENOUS ONCE
Status: COMPLETED | OUTPATIENT
Start: 2025-01-01 | End: 2025-01-01

## 2025-01-01 RX ORDER — POTASSIUM CHLORIDE 1500 MG/1
40 TABLET, EXTENDED RELEASE ORAL PRN
Status: CANCELLED | OUTPATIENT
Start: 2025-01-01

## 2025-01-01 RX ORDER — LORAZEPAM 2 MG/ML
2 INJECTION INTRAMUSCULAR
Status: CANCELLED | OUTPATIENT
Start: 2025-01-01

## 2025-01-01 RX ORDER — LORAZEPAM 2 MG/ML
1 INJECTION INTRAMUSCULAR ONCE
Status: COMPLETED | OUTPATIENT
Start: 2025-01-01 | End: 2025-01-01

## 2025-01-01 RX ORDER — SODIUM CHLORIDE 0.9 % (FLUSH) 0.9 %
5-40 SYRINGE (ML) INJECTION EVERY 12 HOURS SCHEDULED
Status: CANCELLED | OUTPATIENT
Start: 2025-01-01

## 2025-01-01 RX ORDER — LANOLIN ALCOHOL/MO/W.PET/CERES
100 CREAM (GRAM) TOPICAL DAILY
Status: DISCONTINUED | OUTPATIENT
Start: 2025-01-01 | End: 2025-01-01

## 2025-01-01 RX ORDER — ACETAMINOPHEN 650 MG/1
650 SUPPOSITORY RECTAL EVERY 6 HOURS PRN
Status: DISCONTINUED | OUTPATIENT
Start: 2025-01-01 | End: 2025-01-01 | Stop reason: HOSPADM

## 2025-01-01 RX ORDER — HEPARIN SODIUM 1000 [USP'U]/ML
INJECTION, SOLUTION INTRAVENOUS; SUBCUTANEOUS PRN
Status: DISCONTINUED | OUTPATIENT
Start: 2025-01-01 | End: 2025-01-01 | Stop reason: HOSPADM

## 2025-01-01 RX ORDER — SODIUM CHLORIDE 0.9 % (FLUSH) 0.9 %
5-40 SYRINGE (ML) INJECTION PRN
Status: DISCONTINUED | OUTPATIENT
Start: 2025-01-01 | End: 2025-01-01 | Stop reason: HOSPADM

## 2025-01-01 RX ORDER — ACETAMINOPHEN 650 MG/1
650 SUPPOSITORY RECTAL EVERY 6 HOURS PRN
Status: CANCELLED | OUTPATIENT
Start: 2025-01-01

## 2025-01-01 RX ORDER — CALCIUM CHLORIDE, MAGNESIUM CHLORIDE, DEXTROSE MONOHYDRATE, LACTIC ACID, SODIUM CHLORIDE, SODIUM BICARBONATE AND POTASSIUM CHLORIDE 5.15; 2.03; 22; 5.4; 6.46; 3.09; .157 G/L; G/L; G/L; G/L; G/L; G/L; G/L
INJECTION INTRAVENOUS CONTINUOUS
Status: DISCONTINUED | OUTPATIENT
Start: 2025-01-01 | End: 2025-01-01 | Stop reason: HOSPADM

## 2025-01-01 RX ORDER — LORAZEPAM 1 MG/1
1 TABLET ORAL
Status: CANCELLED | OUTPATIENT
Start: 2025-01-01

## 2025-01-01 RX ORDER — SODIUM CHLORIDE 9 MG/ML
INJECTION, SOLUTION INTRAVENOUS PRN
Status: DISCONTINUED | OUTPATIENT
Start: 2025-01-01 | End: 2025-01-01

## 2025-01-01 RX ORDER — POLYETHYLENE GLYCOL 3350 17 G/17G
17 POWDER, FOR SOLUTION ORAL DAILY PRN
Status: CANCELLED | OUTPATIENT
Start: 2025-01-01

## 2025-01-01 RX ORDER — POTASSIUM CHLORIDE 29.8 MG/ML
20 INJECTION INTRAVENOUS PRN
Status: DISCONTINUED | OUTPATIENT
Start: 2025-01-01 | End: 2025-01-01 | Stop reason: HOSPADM

## 2025-01-01 RX ORDER — CHLORDIAZEPOXIDE HYDROCHLORIDE 25 MG/1
25 CAPSULE, GELATIN COATED ORAL 3 TIMES DAILY
Status: DISCONTINUED | OUTPATIENT
Start: 2025-01-01 | End: 2025-01-01

## 2025-01-01 RX ORDER — KETAMINE HYDROCHLORIDE 100 MG/ML
1 INJECTION, SOLUTION INTRAMUSCULAR; INTRAVENOUS ONCE
Status: COMPLETED | OUTPATIENT
Start: 2025-01-01 | End: 2025-01-01

## 2025-01-01 RX ORDER — DEXTROSE, SODIUM CHLORIDE, SODIUM LACTATE, POTASSIUM CHLORIDE, AND CALCIUM CHLORIDE 5; .6; .31; .03; .02 G/100ML; G/100ML; G/100ML; G/100ML; G/100ML
1000 INJECTION, SOLUTION INTRAVENOUS CONTINUOUS
Status: DISCONTINUED | OUTPATIENT
Start: 2025-01-01 | End: 2025-01-01 | Stop reason: HOSPADM

## 2025-01-01 RX ORDER — LIDOCAINE HYDROCHLORIDE 10 MG/ML
50 INJECTION, SOLUTION INFILTRATION; PERINEURAL ONCE
Status: DISCONTINUED | OUTPATIENT
Start: 2025-01-01 | End: 2025-01-01 | Stop reason: HOSPADM

## 2025-01-01 RX ORDER — PHENOBARBITAL SODIUM 65 MG/ML
16.2 INJECTION, SOLUTION INTRAMUSCULAR; INTRAVENOUS EVERY 12 HOURS
Status: DISCONTINUED | OUTPATIENT
Start: 2025-01-01 | End: 2025-01-01

## 2025-01-01 RX ORDER — ONDANSETRON 2 MG/ML
4 INJECTION INTRAMUSCULAR; INTRAVENOUS ONCE
Status: COMPLETED | OUTPATIENT
Start: 2025-01-01 | End: 2025-01-01

## 2025-01-01 RX ORDER — SODIUM CHLORIDE 9 MG/ML
INJECTION, SOLUTION INTRAVENOUS PRN
Status: CANCELLED | OUTPATIENT
Start: 2025-01-01

## 2025-01-01 RX ORDER — CALCIUM GLUCONATE 20 MG/ML
1000 INJECTION, SOLUTION INTRAVENOUS PRN
Status: DISCONTINUED | OUTPATIENT
Start: 2025-01-01 | End: 2025-01-01 | Stop reason: HOSPADM

## 2025-01-01 RX ORDER — FENTANYL CITRATE 50 UG/ML
50 INJECTION, SOLUTION INTRAMUSCULAR; INTRAVENOUS ONCE
Status: COMPLETED | OUTPATIENT
Start: 2025-01-01 | End: 2025-01-01

## 2025-01-01 RX ORDER — GLUCAGON 1 MG/ML
1 KIT INJECTION PRN
Status: DISCONTINUED | OUTPATIENT
Start: 2025-01-01 | End: 2025-01-01 | Stop reason: HOSPADM

## 2025-01-01 RX ORDER — PHENOBARBITAL SODIUM 65 MG/ML
65 INJECTION, SOLUTION INTRAMUSCULAR; INTRAVENOUS EVERY 6 HOURS
Status: DISCONTINUED | OUTPATIENT
Start: 2025-01-01 | End: 2025-01-01

## 2025-01-01 RX ORDER — ONDANSETRON 2 MG/ML
4 INJECTION INTRAMUSCULAR; INTRAVENOUS EVERY 6 HOURS PRN
Status: DISCONTINUED | OUTPATIENT
Start: 2025-01-01 | End: 2025-01-01 | Stop reason: HOSPADM

## 2025-01-01 RX ORDER — LORAZEPAM 2 MG/ML
4 INJECTION INTRAMUSCULAR
Status: CANCELLED | OUTPATIENT
Start: 2025-01-01

## 2025-01-01 RX ORDER — LANOLIN ALCOHOL/MO/W.PET/CERES
100 CREAM (GRAM) TOPICAL DAILY
Status: CANCELLED | OUTPATIENT
Start: 2025-01-01

## 2025-01-01 RX ORDER — CALCIUM CHLORIDE, MAGNESIUM CHLORIDE, DEXTROSE MONOHYDRATE, LACTIC ACID, SODIUM CHLORIDE, SODIUM BICARBONATE AND POTASSIUM CHLORIDE 5.15; 2.03; 22; 5.4; 6.46; 3.09; .157 G/L; G/L; G/L; G/L; G/L; G/L; G/L
1500 INJECTION INTRAVENOUS CONTINUOUS
Status: DISCONTINUED | OUTPATIENT
Start: 2025-01-01 | End: 2025-01-01

## 2025-01-01 RX ORDER — BUPROPION HYDROCHLORIDE 75 MG/1
75 TABLET ORAL DAILY
Status: CANCELLED | OUTPATIENT
Start: 2025-01-01

## 2025-01-01 RX ORDER — 0.9 % SODIUM CHLORIDE 0.9 %
500 INTRAVENOUS SOLUTION INTRAVENOUS ONCE
Status: COMPLETED | OUTPATIENT
Start: 2025-01-01 | End: 2025-01-01

## 2025-01-01 RX ORDER — 0.9 % SODIUM CHLORIDE 0.9 %
500 INTRAVENOUS SOLUTION INTRAVENOUS
Status: DISCONTINUED | OUTPATIENT
Start: 2025-01-01 | End: 2025-01-01 | Stop reason: HOSPADM

## 2025-01-01 RX ORDER — MIDAZOLAM HYDROCHLORIDE 1 MG/ML
5 INJECTION, SOLUTION INTRAMUSCULAR; INTRAVENOUS ONCE
Status: COMPLETED | OUTPATIENT
Start: 2025-01-01 | End: 2025-01-01

## 2025-01-01 RX ORDER — PHENOBARBITAL SODIUM 65 MG/ML
32.5 INJECTION, SOLUTION INTRAMUSCULAR; INTRAVENOUS EVERY 6 HOURS PRN
Status: DISCONTINUED | OUTPATIENT
Start: 2025-01-01 | End: 2025-01-01

## 2025-01-01 RX ORDER — FOLIC ACID 1 MG/1
1 TABLET ORAL DAILY
Status: CANCELLED | OUTPATIENT
Start: 2025-01-01

## 2025-01-01 RX ORDER — CALCIUM GLUCONATE 20 MG/ML
2000 INJECTION, SOLUTION INTRAVENOUS ONCE
Status: COMPLETED | OUTPATIENT
Start: 2025-01-01 | End: 2025-01-01

## 2025-01-01 RX ORDER — MAGNESIUM SULFATE 1 G/100ML
1000 INJECTION INTRAVENOUS PRN
Status: DISCONTINUED | OUTPATIENT
Start: 2025-01-01 | End: 2025-01-01 | Stop reason: HOSPADM

## 2025-01-01 RX ORDER — LORAZEPAM 2 MG/1
4 TABLET ORAL
Status: CANCELLED | OUTPATIENT
Start: 2025-01-01

## 2025-01-01 RX ORDER — METRONIDAZOLE 500 MG/100ML
500 INJECTION, SOLUTION INTRAVENOUS EVERY 8 HOURS
Status: DISCONTINUED | OUTPATIENT
Start: 2025-01-01 | End: 2025-01-01 | Stop reason: HOSPADM

## 2025-01-01 RX ORDER — ONDANSETRON 4 MG/1
4 TABLET, ORALLY DISINTEGRATING ORAL EVERY 8 HOURS PRN
Status: DISCONTINUED | OUTPATIENT
Start: 2025-01-01 | End: 2025-01-01 | Stop reason: HOSPADM

## 2025-01-01 RX ORDER — CALCIUM GLUCONATE 20 MG/ML
1000 INJECTION, SOLUTION INTRAVENOUS ONCE
Status: DISCONTINUED | OUTPATIENT
Start: 2025-01-01 | End: 2025-01-01

## 2025-01-01 RX ORDER — CALCIUM GLUCONATE 20 MG/ML
1000 INJECTION, SOLUTION INTRAVENOUS ONCE
Status: COMPLETED | OUTPATIENT
Start: 2025-01-01 | End: 2025-01-01

## 2025-01-01 RX ORDER — ALBUMIN (HUMAN) 12.5 G/50ML
25 SOLUTION INTRAVENOUS ONCE
Status: COMPLETED | OUTPATIENT
Start: 2025-01-01 | End: 2025-01-01

## 2025-01-01 RX ORDER — PROPOFOL 10 MG/ML
5-50 INJECTION, EMULSION INTRAVENOUS CONTINUOUS
Status: DISCONTINUED | OUTPATIENT
Start: 2025-01-01 | End: 2025-01-01 | Stop reason: HOSPADM

## 2025-01-01 RX ORDER — SODIUM CHLORIDE 9 MG/ML
INJECTION, SOLUTION INTRAVENOUS CONTINUOUS
Status: CANCELLED | OUTPATIENT
Start: 2025-01-01

## 2025-01-01 RX ORDER — ACETAMINOPHEN 325 MG/1
650 TABLET ORAL EVERY 6 HOURS PRN
Status: CANCELLED | OUTPATIENT
Start: 2025-01-01

## 2025-01-01 RX ORDER — PHENOBARBITAL SODIUM 65 MG/ML
65 INJECTION, SOLUTION INTRAMUSCULAR; INTRAVENOUS EVERY 6 HOURS PRN
Status: DISCONTINUED | OUTPATIENT
Start: 2025-01-01 | End: 2025-01-01

## 2025-01-01 RX ORDER — METOCLOPRAMIDE HYDROCHLORIDE 5 MG/ML
10 INJECTION INTRAMUSCULAR; INTRAVENOUS ONCE
Status: COMPLETED | OUTPATIENT
Start: 2025-01-01 | End: 2025-01-01

## 2025-01-01 RX ORDER — ACETAMINOPHEN 325 MG/1
650 TABLET ORAL EVERY 6 HOURS PRN
Status: DISCONTINUED | OUTPATIENT
Start: 2025-01-01 | End: 2025-01-01 | Stop reason: HOSPADM

## 2025-01-01 RX ORDER — SODIUM CHLORIDE 0.9 % (FLUSH) 0.9 %
5-40 SYRINGE (ML) INJECTION EVERY 12 HOURS SCHEDULED
Status: DISCONTINUED | OUTPATIENT
Start: 2025-01-01 | End: 2025-01-01 | Stop reason: HOSPADM

## 2025-01-01 RX ORDER — THIAMINE HYDROCHLORIDE 100 MG/ML
100 INJECTION, SOLUTION INTRAMUSCULAR; INTRAVENOUS DAILY
Status: DISCONTINUED | OUTPATIENT
Start: 2025-01-01 | End: 2025-01-01 | Stop reason: HOSPADM

## 2025-01-01 RX ORDER — ROCURONIUM BROMIDE 10 MG/ML
0.6 INJECTION, SOLUTION INTRAVENOUS ONCE
Status: DISCONTINUED | OUTPATIENT
Start: 2025-01-01 | End: 2025-01-01

## 2025-01-01 RX ORDER — POTASSIUM CHLORIDE 7.45 MG/ML
10 INJECTION INTRAVENOUS PRN
Status: CANCELLED | OUTPATIENT
Start: 2025-01-01

## 2025-01-01 RX ORDER — GLUCAGON 1 MG/ML
1 KIT INJECTION PRN
Status: DISCONTINUED | OUTPATIENT
Start: 2025-01-01 | End: 2025-01-01 | Stop reason: SDUPTHER

## 2025-01-01 RX ORDER — LORAZEPAM 2 MG/1
2 TABLET ORAL
Status: CANCELLED | OUTPATIENT
Start: 2025-01-01

## 2025-01-01 RX ORDER — CASTOR OIL AND BALSAM, PERU 788; 87 MG/G; MG/G
OINTMENT TOPICAL 2 TIMES DAILY
Status: DISCONTINUED | OUTPATIENT
Start: 2025-01-01 | End: 2025-01-01 | Stop reason: HOSPADM

## 2025-01-01 RX ORDER — SODIUM CHLORIDE 0.9 % (FLUSH) 0.9 %
5-40 SYRINGE (ML) INJECTION PRN
Status: DISCONTINUED | OUTPATIENT
Start: 2025-01-01 | End: 2025-01-01

## 2025-01-01 RX ORDER — ASPIRIN 325 MG
325 TABLET ORAL ONCE
Status: COMPLETED | OUTPATIENT
Start: 2025-01-01 | End: 2025-01-01

## 2025-01-01 RX ORDER — OCTREOTIDE ACETATE 100 UG/ML
50 INJECTION, SOLUTION INTRAVENOUS; SUBCUTANEOUS ONCE
Status: COMPLETED | OUTPATIENT
Start: 2025-01-01 | End: 2025-01-01

## 2025-01-01 RX ORDER — PHENOBARBITAL SODIUM 65 MG/ML
16.2 INJECTION, SOLUTION INTRAMUSCULAR; INTRAVENOUS EVERY 6 HOURS PRN
Status: DISCONTINUED | OUTPATIENT
Start: 2025-01-01 | End: 2025-01-01

## 2025-01-01 RX ORDER — MIDAZOLAM HYDROCHLORIDE 1 MG/ML
2 INJECTION, SOLUTION INTRAMUSCULAR; INTRAVENOUS
Status: DISCONTINUED | OUTPATIENT
Start: 2025-01-01 | End: 2025-01-01 | Stop reason: HOSPADM

## 2025-01-01 RX ORDER — PANTOPRAZOLE SODIUM 40 MG/1
40 TABLET, DELAYED RELEASE ORAL
Status: DISCONTINUED | OUTPATIENT
Start: 2025-01-01 | End: 2025-01-01 | Stop reason: SDUPTHER

## 2025-01-01 RX ORDER — ONDANSETRON 4 MG/1
4 TABLET, ORALLY DISINTEGRATING ORAL EVERY 8 HOURS PRN
Status: CANCELLED | OUTPATIENT
Start: 2025-01-01

## 2025-01-01 RX ORDER — LORAZEPAM 2 MG/ML
3 INJECTION INTRAMUSCULAR
Status: CANCELLED | OUTPATIENT
Start: 2025-01-01

## 2025-01-01 RX ORDER — HYDROCORTISONE SODIUM SUCCINATE 100 MG/2ML
50 INJECTION INTRAMUSCULAR; INTRAVENOUS EVERY 6 HOURS
Status: DISCONTINUED | OUTPATIENT
Start: 2025-01-01 | End: 2025-01-01 | Stop reason: HOSPADM

## 2025-01-01 RX ORDER — ONDANSETRON 2 MG/ML
4 INJECTION INTRAMUSCULAR; INTRAVENOUS EVERY 6 HOURS PRN
Status: CANCELLED | OUTPATIENT
Start: 2025-01-01

## 2025-01-01 RX ORDER — M-VIT,TX,IRON,MINS/CALC/FOLIC 27MG-0.4MG
1 TABLET ORAL DAILY
Status: CANCELLED | OUTPATIENT
Start: 2025-01-01

## 2025-01-01 RX ORDER — LORAZEPAM 2 MG/ML
1 INJECTION INTRAMUSCULAR
Status: CANCELLED | OUTPATIENT
Start: 2025-01-01

## 2025-01-01 RX ORDER — ROCURONIUM BROMIDE 10 MG/ML
1 INJECTION, SOLUTION INTRAVENOUS ONCE
Status: COMPLETED | OUTPATIENT
Start: 2025-01-01 | End: 2025-01-01

## 2025-01-01 RX ORDER — LANOLIN ALCOHOL/MO/W.PET/CERES
100 CREAM (GRAM) TOPICAL ONCE
Status: DISCONTINUED | OUTPATIENT
Start: 2025-01-01 | End: 2025-01-01

## 2025-01-01 RX ORDER — FENTANYL CITRATE 50 UG/ML
50 INJECTION, SOLUTION INTRAMUSCULAR; INTRAVENOUS
Status: DISCONTINUED | OUTPATIENT
Start: 2025-01-01 | End: 2025-01-01 | Stop reason: HOSPADM

## 2025-01-01 RX ADMIN — PROPOFOL 50 MCG/KG/MIN: 10 INJECTION, EMULSION INTRAVENOUS at 02:36

## 2025-01-01 RX ADMIN — METRONIDAZOLE 500 MG: 500 INJECTION, SOLUTION INTRAVENOUS at 03:07

## 2025-01-01 RX ADMIN — PHENYLEPHRINE HYDROCHLORIDE 150 MCG/MIN: 10 INJECTION INTRAVENOUS at 13:00

## 2025-01-01 RX ADMIN — DEXTROSE MONOHYDRATE 250 ML: 100 INJECTION, SOLUTION INTRAVENOUS at 02:41

## 2025-01-01 RX ADMIN — NOREPINEPHRINE BITARTRATE 80 MCG/MIN: 0.06 INJECTION, SOLUTION INTRAVENOUS at 12:23

## 2025-01-01 RX ADMIN — CALCIUM CHLORIDE, MAGNESIUM CHLORIDE, DEXTROSE MONOHYDRATE, LACTIC ACID, SODIUM CHLORIDE, SODIUM BICARBONATE AND POTASSIUM CHLORIDE: 5.15; 2.03; 22; 5.4; 6.46; 3.09; .157 INJECTION INTRAVENOUS at 01:15

## 2025-01-01 RX ADMIN — HYDROCORTISONE SODIUM SUCCINATE 50 MG: 100 INJECTION, POWDER, FOR SOLUTION INTRAMUSCULAR; INTRAVENOUS at 20:19

## 2025-01-01 RX ADMIN — SODIUM BICARBONATE: 84 INJECTION, SOLUTION INTRAVENOUS at 09:09

## 2025-01-01 RX ADMIN — CALCIUM GLUCONATE 1000 MG: 20 INJECTION, SOLUTION INTRAVENOUS at 02:42

## 2025-01-01 RX ADMIN — PANTOPRAZOLE SODIUM 40 MG: 40 INJECTION, POWDER, LYOPHILIZED, FOR SOLUTION INTRAVENOUS at 17:06

## 2025-01-01 RX ADMIN — PANTOPRAZOLE SODIUM 8 MG/HR: 40 INJECTION, POWDER, LYOPHILIZED, FOR SOLUTION INTRAVENOUS at 08:19

## 2025-01-01 RX ADMIN — FENTANYL CITRATE 50 MCG: 50 INJECTION, SOLUTION INTRAMUSCULAR; INTRAVENOUS at 20:22

## 2025-01-01 RX ADMIN — Medication 150 MCG/HR: at 01:49

## 2025-01-01 RX ADMIN — PANTOPRAZOLE SODIUM 8 MG/HR: 40 INJECTION, POWDER, LYOPHILIZED, FOR SOLUTION INTRAVENOUS at 01:18

## 2025-01-01 RX ADMIN — IOPAMIDOL 100 ML: 755 INJECTION, SOLUTION INTRAVENOUS at 21:44

## 2025-01-01 RX ADMIN — NOREPINEPHRINE BITARTRATE 80 MCG/MIN: 0.06 INJECTION, SOLUTION INTRAVENOUS at 12:24

## 2025-01-01 RX ADMIN — ONDANSETRON 4 MG: 2 INJECTION, SOLUTION INTRAMUSCULAR; INTRAVENOUS at 16:48

## 2025-01-01 RX ADMIN — CEFEPIME 2000 MG: 2 INJECTION, POWDER, FOR SOLUTION INTRAVENOUS at 23:57

## 2025-01-01 RX ADMIN — METRONIDAZOLE 500 MG: 500 INJECTION, SOLUTION INTRAVENOUS at 13:22

## 2025-01-01 RX ADMIN — SODIUM BICARBONATE: 84 INJECTION, SOLUTION INTRAVENOUS at 07:52

## 2025-01-01 RX ADMIN — MIDAZOLAM HYDROCHLORIDE 2 MG: 1 INJECTION, SOLUTION INTRAMUSCULAR; INTRAVENOUS at 00:55

## 2025-01-01 RX ADMIN — OCTREOTIDE ACETATE 50 MCG/HR: 500 INJECTION, SOLUTION INTRAVENOUS; SUBCUTANEOUS at 13:20

## 2025-01-01 RX ADMIN — SODIUM CHLORIDE 500 ML: 0.9 INJECTION, SOLUTION INTRAVENOUS at 18:30

## 2025-01-01 RX ADMIN — Medication 10 ML: at 23:28

## 2025-01-01 RX ADMIN — CEFEPIME 2000 MG: 2 INJECTION, POWDER, FOR SOLUTION INTRAVENOUS at 00:09

## 2025-01-01 RX ADMIN — CALCIUM CHLORIDE, MAGNESIUM CHLORIDE, DEXTROSE MONOHYDRATE, LACTIC ACID, SODIUM CHLORIDE, SODIUM BICARBONATE AND POTASSIUM CHLORIDE: 5.15; 2.03; 22; 5.4; 6.46; 3.09; .157 INJECTION INTRAVENOUS at 23:27

## 2025-01-01 RX ADMIN — SODIUM BICARBONATE 50 MEQ: 84 INJECTION INTRAVENOUS at 10:30

## 2025-01-01 RX ADMIN — CALCIUM CHLORIDE, MAGNESIUM CHLORIDE, DEXTROSE MONOHYDRATE, LACTIC ACID, SODIUM CHLORIDE, SODIUM BICARBONATE AND POTASSIUM CHLORIDE: 5.15; 2.03; 22; 5.4; 6.46; 3.09; .157 INJECTION INTRAVENOUS at 13:05

## 2025-01-01 RX ADMIN — PHENYLEPHRINE HYDROCHLORIDE 175 MCG/MIN: 10 INJECTION INTRAVENOUS at 14:45

## 2025-01-01 RX ADMIN — PHYTONADIONE 10 MG: 10 INJECTION, EMULSION INTRAMUSCULAR; INTRAVENOUS; SUBCUTANEOUS at 08:45

## 2025-01-01 RX ADMIN — PROPOFOL 45 MCG/KG/MIN: 10 INJECTION, EMULSION INTRAVENOUS at 17:46

## 2025-01-01 RX ADMIN — PHENYLEPHRINE HYDROCHLORIDE 175 MCG/MIN: 10 INJECTION INTRAVENOUS at 18:54

## 2025-01-01 RX ADMIN — SODIUM BICARBONATE 50 MEQ: 84 INJECTION, SOLUTION INTRAVENOUS at 03:18

## 2025-01-01 RX ADMIN — HYDROCORTISONE SODIUM SUCCINATE 50 MG: 100 INJECTION, POWDER, FOR SOLUTION INTRAMUSCULAR; INTRAVENOUS at 20:14

## 2025-01-01 RX ADMIN — CEFEPIME 2000 MG: 2 INJECTION, POWDER, FOR SOLUTION INTRAVENOUS at 16:07

## 2025-01-01 RX ADMIN — SODIUM BICARBONATE 50 MEQ: 84 INJECTION, SOLUTION INTRAVENOUS at 07:00

## 2025-01-01 RX ADMIN — SODIUM CHLORIDE, PRESERVATIVE FREE 10 ML: 5 INJECTION INTRAVENOUS at 23:14

## 2025-01-01 RX ADMIN — SODIUM BICARBONATE 50 MEQ: 84 INJECTION INTRAVENOUS at 10:29

## 2025-01-01 RX ADMIN — OCTREOTIDE ACETATE 50 MCG/HR: 500 INJECTION, SOLUTION INTRAVENOUS; SUBCUTANEOUS at 10:35

## 2025-01-01 RX ADMIN — NOREPINEPHRINE BITARTRATE 80 MCG/MIN: 0.06 INJECTION, SOLUTION INTRAVENOUS at 01:56

## 2025-01-01 RX ADMIN — METRONIDAZOLE 500 MG: 500 INJECTION, SOLUTION INTRAVENOUS at 12:11

## 2025-01-01 RX ADMIN — VASOPRESSIN 0.04 UNITS/MIN: 20 INJECTION INTRAVENOUS at 13:01

## 2025-01-01 RX ADMIN — SODIUM BICARBONATE 50 MEQ: 84 INJECTION INTRAVENOUS at 22:45

## 2025-01-01 RX ADMIN — Medication 10 ML: at 20:19

## 2025-01-01 RX ADMIN — PROPOFOL 40 MCG/KG/MIN: 10 INJECTION, EMULSION INTRAVENOUS at 11:10

## 2025-01-01 RX ADMIN — PHENYLEPHRINE HYDROCHLORIDE 175 MCG/MIN: 10 INJECTION INTRAVENOUS at 10:55

## 2025-01-01 RX ADMIN — PROPOFOL 45 MCG/KG/MIN: 10 INJECTION, EMULSION INTRAVENOUS at 11:03

## 2025-01-01 RX ADMIN — METOCLOPRAMIDE 10 MG: 5 INJECTION, SOLUTION INTRAMUSCULAR; INTRAVENOUS at 18:35

## 2025-01-01 RX ADMIN — Medication 150 MCG/HR: at 16:53

## 2025-01-01 RX ADMIN — NOREPINEPHRINE BITARTRATE 70 MCG/MIN: 0.06 INJECTION, SOLUTION INTRAVENOUS at 00:59

## 2025-01-01 RX ADMIN — VANCOMYCIN HYDROCHLORIDE 2500 MG: 1 INJECTION, POWDER, LYOPHILIZED, FOR SOLUTION INTRAVENOUS at 16:09

## 2025-01-01 RX ADMIN — THIAMINE HYDROCHLORIDE 100 MG: 100 INJECTION, SOLUTION INTRAMUSCULAR; INTRAVENOUS at 14:23

## 2025-01-01 RX ADMIN — PHENYLEPHRINE HYDROCHLORIDE 30 MCG/MIN: 10 INJECTION INTRAVENOUS at 00:35

## 2025-01-01 RX ADMIN — CALCIUM CHLORIDE, MAGNESIUM CHLORIDE, DEXTROSE MONOHYDRATE, LACTIC ACID, SODIUM CHLORIDE, SODIUM BICARBONATE AND POTASSIUM CHLORIDE: 5.15; 2.03; 22; 5.4; 6.46; 3.09; .157 INJECTION INTRAVENOUS at 08:50

## 2025-01-01 RX ADMIN — VASOPRESSIN 0.04 UNITS/MIN: 20 INJECTION INTRAVENOUS at 04:23

## 2025-01-01 RX ADMIN — NOREPINEPHRINE BITARTRATE 65 MCG/MIN: 0.06 INJECTION, SOLUTION INTRAVENOUS at 05:12

## 2025-01-01 RX ADMIN — Medication 50 MCG/HR: at 19:32

## 2025-01-01 RX ADMIN — SODIUM CHLORIDE 500 ML: 0.9 INJECTION, SOLUTION INTRAVENOUS at 15:17

## 2025-01-01 RX ADMIN — PHENYLEPHRINE HYDROCHLORIDE 175 MCG/MIN: 10 INJECTION INTRAVENOUS at 01:03

## 2025-01-01 RX ADMIN — CALCIUM GLUCONATE 1000 MG: 20 INJECTION, SOLUTION INTRAVENOUS at 19:56

## 2025-01-01 RX ADMIN — PHENYLEPHRINE HYDROCHLORIDE 175 MCG/MIN: 10 INJECTION INTRAVENOUS at 06:15

## 2025-01-01 RX ADMIN — METRONIDAZOLE 500 MG: 500 INJECTION, SOLUTION INTRAVENOUS at 20:26

## 2025-01-01 RX ADMIN — VANCOMYCIN HYDROCHLORIDE 750 MG: 750 INJECTION, POWDER, LYOPHILIZED, FOR SOLUTION INTRAVENOUS at 09:12

## 2025-01-01 RX ADMIN — PROPOFOL 50 MCG/KG/MIN: 10 INJECTION, EMULSION INTRAVENOUS at 20:17

## 2025-01-01 RX ADMIN — Medication 125 MCG/HR: at 00:26

## 2025-01-01 RX ADMIN — CALCIUM CHLORIDE, MAGNESIUM CHLORIDE, DEXTROSE MONOHYDRATE, LACTIC ACID, SODIUM CHLORIDE, SODIUM BICARBONATE AND POTASSIUM CHLORIDE 1500 ML/HR: 5.15; 2.03; 22; 5.4; 6.46; 3.09; .157 INJECTION INTRAVENOUS at 23:34

## 2025-01-01 RX ADMIN — DEXTROSE MONOHYDRATE 125 ML: 100 INJECTION, SOLUTION INTRAVENOUS at 21:23

## 2025-01-01 RX ADMIN — CALCIUM CHLORIDE, MAGNESIUM CHLORIDE, DEXTROSE MONOHYDRATE, LACTIC ACID, SODIUM CHLORIDE, SODIUM BICARBONATE AND POTASSIUM CHLORIDE: 5.15; 2.03; 22; 5.4; 6.46; 3.09; .157 INJECTION INTRAVENOUS at 09:09

## 2025-01-01 RX ADMIN — HYDROCORTISONE SODIUM SUCCINATE 50 MG: 100 INJECTION, POWDER, FOR SOLUTION INTRAMUSCULAR; INTRAVENOUS at 14:07

## 2025-01-01 RX ADMIN — CEFEPIME 2000 MG: 2 INJECTION, POWDER, FOR SOLUTION INTRAVENOUS at 15:17

## 2025-01-01 RX ADMIN — CALCIUM CHLORIDE, MAGNESIUM CHLORIDE, DEXTROSE MONOHYDRATE, LACTIC ACID, SODIUM CHLORIDE, SODIUM BICARBONATE AND POTASSIUM CHLORIDE: 5.15; 2.03; 22; 5.4; 6.46; 3.09; .157 INJECTION INTRAVENOUS at 22:37

## 2025-01-01 RX ADMIN — CALCIUM CHLORIDE, MAGNESIUM CHLORIDE, DEXTROSE MONOHYDRATE, LACTIC ACID, SODIUM CHLORIDE, SODIUM BICARBONATE AND POTASSIUM CHLORIDE 500 ML/HR: 5.15; 2.03; 22; 5.4; 6.46; 3.09; .157 INJECTION INTRAVENOUS at 13:23

## 2025-01-01 RX ADMIN — VANCOMYCIN HYDROCHLORIDE 1000 MG: 1 INJECTION, POWDER, LYOPHILIZED, FOR SOLUTION INTRAVENOUS at 10:15

## 2025-01-01 RX ADMIN — SODIUM CHLORIDE 1000 ML: 0.9 INJECTION, SOLUTION INTRAVENOUS at 14:09

## 2025-01-01 RX ADMIN — PHENYLEPHRINE HYDROCHLORIDE 150 MCG/MIN: 10 INJECTION INTRAVENOUS at 18:51

## 2025-01-01 RX ADMIN — SODIUM CHLORIDE 1000 MG: 9 INJECTION, SOLUTION INTRAVENOUS at 23:50

## 2025-01-01 RX ADMIN — Medication 150 MCG/HR: at 12:57

## 2025-01-01 RX ADMIN — CALCIUM GLUCONATE 1000 MG: 20 INJECTION, SOLUTION INTRAVENOUS at 18:21

## 2025-01-01 RX ADMIN — PANTOPRAZOLE SODIUM 8 MG/HR: 40 INJECTION, POWDER, LYOPHILIZED, FOR SOLUTION INTRAVENOUS at 11:22

## 2025-01-01 RX ADMIN — Medication 150 MCG/HR: at 09:45

## 2025-01-01 RX ADMIN — ASPIRIN 325 MG: 325 TABLET ORAL at 14:25

## 2025-01-01 RX ADMIN — CALCIUM CHLORIDE, MAGNESIUM CHLORIDE, DEXTROSE MONOHYDRATE, LACTIC ACID, SODIUM CHLORIDE, SODIUM BICARBONATE AND POTASSIUM CHLORIDE: 5.15; 2.03; 22; 5.4; 6.46; 3.09; .157 INJECTION INTRAVENOUS at 20:06

## 2025-01-01 RX ADMIN — VANCOMYCIN HYDROCHLORIDE 2000 MG: 10 INJECTION, POWDER, LYOPHILIZED, FOR SOLUTION INTRAVENOUS at 21:38

## 2025-01-01 RX ADMIN — NOREPINEPHRINE BITARTRATE 80 MCG/MIN: 0.06 INJECTION, SOLUTION INTRAVENOUS at 18:09

## 2025-01-01 RX ADMIN — INSULIN HUMAN 10 UNITS: 100 INJECTION, SOLUTION PARENTERAL at 07:32

## 2025-01-01 RX ADMIN — IOPAMIDOL 75 ML: 755 INJECTION, SOLUTION INTRAVENOUS at 13:48

## 2025-01-01 RX ADMIN — CALCIUM CHLORIDE, MAGNESIUM CHLORIDE, DEXTROSE MONOHYDRATE, LACTIC ACID, SODIUM CHLORIDE, SODIUM BICARBONATE AND POTASSIUM CHLORIDE: 5.15; 2.03; 22; 5.4; 6.46; 3.09; .157 INJECTION INTRAVENOUS at 13:04

## 2025-01-01 RX ADMIN — FENTANYL CITRATE 25 MCG: 50 INJECTION, SOLUTION INTRAMUSCULAR; INTRAVENOUS at 17:05

## 2025-01-01 RX ADMIN — PANTOPRAZOLE SODIUM 40 MG: 40 INJECTION, POWDER, LYOPHILIZED, FOR SOLUTION INTRAVENOUS at 16:48

## 2025-01-01 RX ADMIN — VASOPRESSIN 0.04 UNITS/MIN: 20 INJECTION INTRAVENOUS at 15:08

## 2025-01-01 RX ADMIN — ROCURONIUM BROMIDE 113 MG: 10 INJECTION, SOLUTION INTRAVENOUS at 17:28

## 2025-01-01 RX ADMIN — PROPOFOL 50 MCG/KG/MIN: 10 INJECTION, EMULSION INTRAVENOUS at 07:34

## 2025-01-01 RX ADMIN — VASOPRESSIN 0.03 UNITS/MIN: 20 INJECTION INTRAVENOUS at 19:58

## 2025-01-01 RX ADMIN — OCTREOTIDE ACETATE 50 MCG/HR: 500 INJECTION, SOLUTION INTRAVENOUS; SUBCUTANEOUS at 02:52

## 2025-01-01 RX ADMIN — CALCIUM GLUCONATE 2000 MG: 20 INJECTION, SOLUTION INTRAVENOUS at 08:03

## 2025-01-01 RX ADMIN — KETAMINE HYDROCHLORIDE 110 MG: 100 INJECTION INTRAMUSCULAR; INTRAVENOUS at 17:28

## 2025-01-01 RX ADMIN — PANTOPRAZOLE SODIUM 8 MG/HR: 40 INJECTION, POWDER, LYOPHILIZED, FOR SOLUTION INTRAVENOUS at 17:21

## 2025-01-01 RX ADMIN — DEXTROSE, SODIUM CHLORIDE, SODIUM LACTATE, POTASSIUM CHLORIDE, AND CALCIUM CHLORIDE 1000 ML: 5; .6; .31; .03; .02 INJECTION, SOLUTION INTRAVENOUS at 11:11

## 2025-01-01 RX ADMIN — PANTOPRAZOLE SODIUM 8 MG/HR: 40 INJECTION, POWDER, LYOPHILIZED, FOR SOLUTION INTRAVENOUS at 22:19

## 2025-01-01 RX ADMIN — DEXTROSE, SODIUM CHLORIDE, SODIUM LACTATE, POTASSIUM CHLORIDE, AND CALCIUM CHLORIDE 1000 ML: 5; .6; .31; .03; .02 INJECTION, SOLUTION INTRAVENOUS at 01:03

## 2025-01-01 RX ADMIN — DEXTROSE MONOHYDRATE 250 ML: 100 INJECTION, SOLUTION INTRAVENOUS at 07:34

## 2025-01-01 RX ADMIN — CALCIUM GLUCONATE 1000 MG: 20 INJECTION, SOLUTION INTRAVENOUS at 06:47

## 2025-01-01 RX ADMIN — PHENYLEPHRINE HYDROCHLORIDE 150 MCG/MIN: 10 INJECTION INTRAVENOUS at 07:25

## 2025-01-01 RX ADMIN — NOREPINEPHRINE BITARTRATE 80 MCG/MIN: 0.06 INJECTION, SOLUTION INTRAVENOUS at 15:08

## 2025-01-01 RX ADMIN — NOREPINEPHRINE BITARTRATE 60 MCG/MIN: 0.06 INJECTION, SOLUTION INTRAVENOUS at 09:08

## 2025-01-01 RX ADMIN — MIDAZOLAM HYDROCHLORIDE 2 MG: 1 INJECTION, SOLUTION INTRAMUSCULAR; INTRAVENOUS at 20:23

## 2025-01-01 RX ADMIN — Medication 150 MCG/HR: at 05:57

## 2025-01-01 RX ADMIN — SODIUM BICARBONATE: 84 INJECTION, SOLUTION INTRAVENOUS at 00:07

## 2025-01-01 RX ADMIN — DEXTROSE, SODIUM CHLORIDE, SODIUM LACTATE, POTASSIUM CHLORIDE, AND CALCIUM CHLORIDE 1000 ML: 5; .6; .31; .03; .02 INJECTION, SOLUTION INTRAVENOUS at 22:17

## 2025-01-01 RX ADMIN — SODIUM BICARBONATE 50 MEQ: 84 INJECTION INTRAVENOUS at 03:00

## 2025-01-01 RX ADMIN — OCTREOTIDE ACETATE 50 MCG/HR: 500 INJECTION, SOLUTION INTRAVENOUS; SUBCUTANEOUS at 21:25

## 2025-01-01 RX ADMIN — ALBUMIN (HUMAN) 25 G: 0.25 INJECTION, SOLUTION INTRAVENOUS at 16:26

## 2025-01-01 RX ADMIN — SODIUM BICARBONATE 50 MEQ: 84 INJECTION, SOLUTION INTRAVENOUS at 04:27

## 2025-01-01 RX ADMIN — NOREPINEPHRINE BITARTRATE 80 MCG/MIN: 0.06 INJECTION, SOLUTION INTRAVENOUS at 09:19

## 2025-01-01 RX ADMIN — METRONIDAZOLE 500 MG: 500 INJECTION, SOLUTION INTRAVENOUS at 20:20

## 2025-01-01 RX ADMIN — ETOMIDATE 10 MG: 2 INJECTION, SOLUTION INTRAVENOUS at 19:27

## 2025-01-01 RX ADMIN — METRONIDAZOLE 500 MG: 500 INJECTION, SOLUTION INTRAVENOUS at 04:52

## 2025-01-01 RX ADMIN — SODIUM BICARBONATE 50 MEQ: 84 INJECTION, SOLUTION INTRAVENOUS at 05:41

## 2025-01-01 RX ADMIN — NOREPINEPHRINE BITARTRATE 80 MCG/MIN: 0.06 INJECTION, SOLUTION INTRAVENOUS at 05:58

## 2025-01-01 RX ADMIN — CALCIUM CHLORIDE, MAGNESIUM CHLORIDE, DEXTROSE MONOHYDRATE, LACTIC ACID, SODIUM CHLORIDE, SODIUM BICARBONATE AND POTASSIUM CHLORIDE 1500 ML/HR: 5.15; 2.03; 22; 5.4; 6.46; 3.09; .157 INJECTION INTRAVENOUS at 06:32

## 2025-01-01 RX ADMIN — VASOPRESSIN 0.04 UNITS/MIN: 20 INJECTION INTRAVENOUS at 22:21

## 2025-01-01 RX ADMIN — Medication 10 ML: at 20:15

## 2025-01-01 RX ADMIN — PROPOFOL 10 MCG/KG/MIN: 10 INJECTION, EMULSION INTRAVENOUS at 19:32

## 2025-01-01 RX ADMIN — Medication: at 20:19

## 2025-01-01 RX ADMIN — NOREPINEPHRINE BITARTRATE 100 MCG/MIN: 0.06 INJECTION, SOLUTION INTRAVENOUS at 00:08

## 2025-01-01 RX ADMIN — CEFEPIME 2000 MG: 2 INJECTION, POWDER, FOR SOLUTION INTRAVENOUS at 09:06

## 2025-01-01 RX ADMIN — INSULIN HUMAN 10 UNITS: 100 INJECTION, SOLUTION PARENTERAL at 02:35

## 2025-01-01 RX ADMIN — PROPOFOL 45 MCG/KG/MIN: 10 INJECTION, EMULSION INTRAVENOUS at 14:24

## 2025-01-01 RX ADMIN — Medication: at 10:15

## 2025-01-01 RX ADMIN — SODIUM BICARBONATE 50 MEQ: 84 INJECTION INTRAVENOUS at 21:48

## 2025-01-01 RX ADMIN — CALCIUM CHLORIDE, MAGNESIUM CHLORIDE, DEXTROSE MONOHYDRATE, LACTIC ACID, SODIUM CHLORIDE, SODIUM BICARBONATE AND POTASSIUM CHLORIDE 1500 ML/HR: 5.15; 2.03; 22; 5.4; 6.46; 3.09; .157 INJECTION INTRAVENOUS at 20:16

## 2025-01-01 RX ADMIN — OCTREOTIDE ACETATE 50 MCG: 100 INJECTION, SOLUTION INTRAVENOUS; SUBCUTANEOUS at 17:18

## 2025-01-01 RX ADMIN — VASOPRESSIN 0.04 UNITS/MIN: 20 INJECTION INTRAVENOUS at 06:46

## 2025-01-01 RX ADMIN — PROPOFOL 40 MCG/KG/MIN: 10 INJECTION, EMULSION INTRAVENOUS at 17:40

## 2025-01-01 RX ADMIN — HYDROCORTISONE SODIUM SUCCINATE 50 MG: 100 INJECTION, POWDER, FOR SOLUTION INTRAMUSCULAR; INTRAVENOUS at 07:55

## 2025-01-01 RX ADMIN — SODIUM BICARBONATE 50 MEQ: 84 INJECTION INTRAVENOUS at 07:06

## 2025-01-01 RX ADMIN — CALCIUM CHLORIDE, MAGNESIUM CHLORIDE, DEXTROSE MONOHYDRATE, LACTIC ACID, SODIUM CHLORIDE, SODIUM BICARBONATE AND POTASSIUM CHLORIDE: 5.15; 2.03; 22; 5.4; 6.46; 3.09; .157 INJECTION INTRAVENOUS at 17:08

## 2025-01-01 RX ADMIN — CALCIUM CHLORIDE, MAGNESIUM CHLORIDE, DEXTROSE MONOHYDRATE, LACTIC ACID, SODIUM CHLORIDE, SODIUM BICARBONATE AND POTASSIUM CHLORIDE 500 ML/HR: 5.15; 2.03; 22; 5.4; 6.46; 3.09; .157 INJECTION INTRAVENOUS at 13:40

## 2025-01-01 RX ADMIN — OCTREOTIDE ACETATE 50 MCG/HR: 500 INJECTION, SOLUTION INTRAVENOUS; SUBCUTANEOUS at 00:08

## 2025-01-01 RX ADMIN — CALCIUM CHLORIDE, MAGNESIUM CHLORIDE, DEXTROSE MONOHYDRATE, LACTIC ACID, SODIUM CHLORIDE, SODIUM BICARBONATE AND POTASSIUM CHLORIDE 1500 ML/HR: 5.15; 2.03; 22; 5.4; 6.46; 3.09; .157 INJECTION INTRAVENOUS at 13:25

## 2025-01-01 RX ADMIN — MIDAZOLAM HYDROCHLORIDE 2 MG: 1 INJECTION, SOLUTION INTRAMUSCULAR; INTRAVENOUS at 23:52

## 2025-01-01 RX ADMIN — PROPOFOL 50 MCG/KG/MIN: 10 INJECTION, EMULSION INTRAVENOUS at 04:47

## 2025-01-01 RX ADMIN — ACETAMINOPHEN 650 MG: 325 TABLET ORAL at 11:59

## 2025-01-01 RX ADMIN — VASOPRESSIN 0.04 UNITS/MIN: 20 INJECTION INTRAVENOUS at 00:27

## 2025-01-01 RX ADMIN — HYDROCORTISONE SODIUM SUCCINATE 50 MG: 100 INJECTION, POWDER, FOR SOLUTION INTRAMUSCULAR; INTRAVENOUS at 01:22

## 2025-01-01 RX ADMIN — MIDAZOLAM HYDROCHLORIDE 2 MG: 1 INJECTION, SOLUTION INTRAMUSCULAR; INTRAVENOUS at 08:49

## 2025-01-01 RX ADMIN — CALCIUM GLUCONATE 1000 MG: 20 INJECTION, SOLUTION INTRAVENOUS at 12:07

## 2025-01-01 RX ADMIN — CALCIUM CHLORIDE, MAGNESIUM CHLORIDE, DEXTROSE MONOHYDRATE, LACTIC ACID, SODIUM CHLORIDE, SODIUM BICARBONATE AND POTASSIUM CHLORIDE 500 ML/HR: 5.15; 2.03; 22; 5.4; 6.46; 3.09; .157 INJECTION INTRAVENOUS at 23:36

## 2025-01-01 RX ADMIN — Medication 175 MCG/HR: at 20:14

## 2025-01-01 RX ADMIN — PHENYLEPHRINE HYDROCHLORIDE 300 MCG/MIN: 10 INJECTION INTRAVENOUS at 23:23

## 2025-01-01 RX ADMIN — SODIUM BICARBONATE 50 MEQ: 84 INJECTION INTRAVENOUS at 01:22

## 2025-01-01 RX ADMIN — SODIUM BICARBONATE: 84 INJECTION, SOLUTION INTRAVENOUS at 15:06

## 2025-01-01 RX ADMIN — PROPOFOL 35 MCG/KG/MIN: 10 INJECTION, EMULSION INTRAVENOUS at 00:26

## 2025-01-01 RX ADMIN — SODIUM BICARBONATE 50 MEQ: 84 INJECTION, SOLUTION INTRAVENOUS at 02:07

## 2025-01-01 RX ADMIN — NOREPINEPHRINE BITARTRATE 100 MCG/MIN: 0.06 INJECTION, SOLUTION INTRAVENOUS at 21:19

## 2025-01-01 RX ADMIN — ALBUMIN (HUMAN) 50 G: 0.25 INJECTION, SOLUTION INTRAVENOUS at 01:42

## 2025-01-01 RX ADMIN — SODIUM CHLORIDE 500 ML: 0.9 INJECTION, SOLUTION INTRAVENOUS at 16:09

## 2025-01-01 RX ADMIN — NOREPINEPHRINE BITARTRATE 5 MCG/MIN: 0.06 INJECTION, SOLUTION INTRAVENOUS at 19:06

## 2025-01-01 RX ADMIN — NOREPINEPHRINE BITARTRATE 90 MCG/MIN: 0.06 INJECTION, SOLUTION INTRAVENOUS at 18:20

## 2025-01-01 RX ADMIN — HYDROCORTISONE SODIUM SUCCINATE 50 MG: 100 INJECTION, POWDER, FOR SOLUTION INTRAMUSCULAR; INTRAVENOUS at 08:39

## 2025-01-01 RX ADMIN — CHLORDIAZEPOXIDE HYDROCHLORIDE 25 MG: 25 CAPSULE ORAL at 15:27

## 2025-01-01 RX ADMIN — PROPOFOL 40 MCG/KG/MIN: 10 INJECTION, EMULSION INTRAVENOUS at 14:07

## 2025-01-01 RX ADMIN — CALCIUM GLUCONATE 2000 MG: 20 INJECTION, SOLUTION INTRAVENOUS at 01:31

## 2025-01-01 RX ADMIN — PANTOPRAZOLE SODIUM 8 MG/HR: 40 INJECTION, POWDER, LYOPHILIZED, FOR SOLUTION INTRAVENOUS at 22:22

## 2025-01-01 RX ADMIN — LORAZEPAM 1 MG: 2 INJECTION, SOLUTION INTRAMUSCULAR; INTRAVENOUS at 10:38

## 2025-01-01 RX ADMIN — PROPOFOL 35 MCG/KG/MIN: 10 INJECTION, EMULSION INTRAVENOUS at 07:27

## 2025-01-01 RX ADMIN — PROPOFOL 40 MCG/KG/MIN: 10 INJECTION, EMULSION INTRAVENOUS at 21:42

## 2025-01-01 RX ADMIN — CEFEPIME 2000 MG: 2 INJECTION, POWDER, FOR SOLUTION INTRAVENOUS at 03:00

## 2025-01-01 RX ADMIN — NOREPINEPHRINE BITARTRATE 60 MCG/MIN: 0.06 INJECTION, SOLUTION INTRAVENOUS at 21:56

## 2025-01-01 RX ADMIN — OCTREOTIDE ACETATE 50 MCG/HR: 500 INJECTION, SOLUTION INTRAVENOUS; SUBCUTANEOUS at 17:20

## 2025-01-01 RX ADMIN — CALCIUM CHLORIDE, MAGNESIUM CHLORIDE, DEXTROSE MONOHYDRATE, LACTIC ACID, SODIUM CHLORIDE, SODIUM BICARBONATE AND POTASSIUM CHLORIDE 1500 ML/HR: 5.15; 2.03; 22; 5.4; 6.46; 3.09; .157 INJECTION INTRAVENOUS at 13:24

## 2025-01-01 RX ADMIN — SODIUM BICARBONATE: 84 INJECTION, SOLUTION INTRAVENOUS at 23:11

## 2025-01-01 RX ADMIN — SODIUM BICARBONATE: 84 INJECTION, SOLUTION INTRAVENOUS at 16:10

## 2025-01-01 RX ADMIN — NOREPINEPHRINE BITARTRATE 80 MCG/MIN: 0.06 INJECTION, SOLUTION INTRAVENOUS at 15:37

## 2025-01-01 RX ADMIN — PROPOFOL 35 MCG/KG/MIN: 10 INJECTION, EMULSION INTRAVENOUS at 03:05

## 2025-01-01 RX ADMIN — HYDROCORTISONE SODIUM SUCCINATE 50 MG: 100 INJECTION, POWDER, FOR SOLUTION INTRAMUSCULAR; INTRAVENOUS at 14:23

## 2025-01-01 RX ADMIN — Medication: at 20:14

## 2025-01-01 RX ADMIN — ONDANSETRON 4 MG: 2 INJECTION, SOLUTION INTRAMUSCULAR; INTRAVENOUS at 12:02

## 2025-01-01 RX ADMIN — CEFEPIME 2000 MG: 2 INJECTION, POWDER, FOR SOLUTION INTRAVENOUS at 16:12

## 2025-01-01 RX ADMIN — CALCIUM CHLORIDE, MAGNESIUM CHLORIDE, DEXTROSE MONOHYDRATE, LACTIC ACID, SODIUM CHLORIDE, SODIUM BICARBONATE AND POTASSIUM CHLORIDE 500 ML/HR: 5.15; 2.03; 22; 5.4; 6.46; 3.09; .157 INJECTION INTRAVENOUS at 23:34

## 2025-01-01 RX ADMIN — CALCIUM CHLORIDE, MAGNESIUM CHLORIDE, DEXTROSE MONOHYDRATE, LACTIC ACID, SODIUM CHLORIDE, SODIUM BICARBONATE AND POTASSIUM CHLORIDE 1500 ML/HR: 5.15; 2.03; 22; 5.4; 6.46; 3.09; .157 INJECTION INTRAVENOUS at 03:07

## 2025-01-01 RX ADMIN — SODIUM CHLORIDE 1000 ML: 0.9 INJECTION, SOLUTION INTRAVENOUS at 10:39

## 2025-01-01 RX ADMIN — PROPOFOL 50 MCG/KG/MIN: 10 INJECTION, EMULSION INTRAVENOUS at 23:17

## 2025-01-01 RX ADMIN — SODIUM BICARBONATE: 84 INJECTION, SOLUTION INTRAVENOUS at 22:57

## 2025-01-01 RX ADMIN — CALCIUM CHLORIDE, MAGNESIUM CHLORIDE, DEXTROSE MONOHYDRATE, LACTIC ACID, SODIUM CHLORIDE, SODIUM BICARBONATE AND POTASSIUM CHLORIDE 1500 ML/HR: 5.15; 2.03; 22; 5.4; 6.46; 3.09; .157 INJECTION INTRAVENOUS at 16:49

## 2025-01-01 ASSESSMENT — PAIN SCALES - GENERAL: PAINLEVEL_OUTOF10: 10

## 2025-01-01 ASSESSMENT — PULMONARY FUNCTION TESTS
PIF_VALUE: 29
PIF_VALUE: 21
PIF_VALUE: 33
PIF_VALUE: 24
PIF_VALUE: 19
PIF_VALUE: 27
PIF_VALUE: 27
PIF_VALUE: 26
PIF_VALUE: 24
PIF_VALUE: 26
PIF_VALUE: 18
PIF_VALUE: 27
PIF_VALUE: 28
PIF_VALUE: 29

## 2025-01-01 ASSESSMENT — PAIN DESCRIPTION - LOCATION: LOCATION: ABDOMEN

## 2025-01-01 ASSESSMENT — PAIN DESCRIPTION - ORIENTATION: ORIENTATION: MID

## 2025-01-01 ASSESSMENT — PAIN DESCRIPTION - PAIN TYPE: TYPE: ACUTE PAIN

## 2025-02-04 ENCOUNTER — HOSPITAL ENCOUNTER (INPATIENT)
Age: 55
LOS: 1 days | Discharge: LEFT BEFORE DISCHARGE | DRG: 770 | End: 2025-02-06
Attending: STUDENT IN AN ORGANIZED HEALTH CARE EDUCATION/TRAINING PROGRAM | Admitting: STUDENT IN AN ORGANIZED HEALTH CARE EDUCATION/TRAINING PROGRAM
Payer: COMMERCIAL

## 2025-02-04 DIAGNOSIS — F10.10 ALCOHOL ABUSE: Primary | ICD-10-CM

## 2025-02-04 DIAGNOSIS — I10 ESSENTIAL HYPERTENSION: ICD-10-CM

## 2025-02-04 DIAGNOSIS — R79.89 ELEVATED LFTS: ICD-10-CM

## 2025-02-04 DIAGNOSIS — R11.0 NAUSEA: ICD-10-CM

## 2025-02-04 LAB
ALBUMIN SERPL-MCNC: 4.2 G/DL (ref 3.4–5)
ALBUMIN/GLOB SERPL: 1.3 {RATIO} (ref 1.1–2.2)
ALP SERPL-CCNC: 169 U/L (ref 40–129)
ALT SERPL-CCNC: 129 U/L (ref 10–40)
AMPHETAMINES UR QL SCN>1000 NG/ML: ABNORMAL
ANION GAP SERPL CALCULATED.3IONS-SCNC: 14 MMOL/L (ref 3–16)
AST SERPL-CCNC: 182 U/L (ref 15–37)
BARBITURATES UR QL SCN>200 NG/ML: ABNORMAL
BASOPHILS # BLD: 0.1 K/UL (ref 0–0.2)
BASOPHILS NFR BLD: 1 %
BENZODIAZ UR QL SCN>200 NG/ML: POSITIVE
BILIRUB SERPL-MCNC: 1.1 MG/DL (ref 0–1)
BILIRUB UR QL STRIP.AUTO: NEGATIVE
BUN SERPL-MCNC: 11 MG/DL (ref 7–20)
CALCIUM SERPL-MCNC: 9 MG/DL (ref 8.3–10.6)
CANNABINOIDS UR QL SCN>50 NG/ML: ABNORMAL
CHLORIDE SERPL-SCNC: 99 MMOL/L (ref 99–110)
CLARITY UR: CLEAR
CO2 SERPL-SCNC: 24 MMOL/L (ref 21–32)
COCAINE UR QL SCN: ABNORMAL
COLOR UR: YELLOW
CREAT SERPL-MCNC: 0.7 MG/DL (ref 0.9–1.3)
DEPRECATED RDW RBC AUTO: 15.9 % (ref 12.4–15.4)
DRUG SCREEN COMMENT UR-IMP: ABNORMAL
EOSINOPHIL # BLD: 0.2 K/UL (ref 0–0.6)
EOSINOPHIL NFR BLD: 2.4 %
ETHANOLAMINE SERPL-MCNC: 166 MG/DL (ref 0–0.08)
FENTANYL SCREEN, URINE: ABNORMAL
GFR SERPLBLD CREATININE-BSD FMLA CKD-EPI: >90 ML/MIN/{1.73_M2}
GLUCOSE SERPL-MCNC: 103 MG/DL (ref 70–99)
GLUCOSE UR STRIP.AUTO-MCNC: NEGATIVE MG/DL
HCT VFR BLD AUTO: 42.5 % (ref 40.5–52.5)
HGB BLD-MCNC: 14.5 G/DL (ref 13.5–17.5)
HGB UR QL STRIP.AUTO: NEGATIVE
KETONES UR STRIP.AUTO-MCNC: NEGATIVE MG/DL
LEUKOCYTE ESTERASE UR QL STRIP.AUTO: NEGATIVE
LIPASE SERPL-CCNC: 37 U/L (ref 13–60)
LYMPHOCYTES # BLD: 2.7 K/UL (ref 1–5.1)
LYMPHOCYTES NFR BLD: 28.2 %
MCH RBC QN AUTO: 28.3 PG (ref 26–34)
MCHC RBC AUTO-ENTMCNC: 34 G/DL (ref 31–36)
MCV RBC AUTO: 83.1 FL (ref 80–100)
METHADONE UR QL SCN>300 NG/ML: ABNORMAL
MONOCYTES # BLD: 0.8 K/UL (ref 0–1.3)
MONOCYTES NFR BLD: 8.8 %
NEUTROPHILS # BLD: 5.7 K/UL (ref 1.7–7.7)
NEUTROPHILS NFR BLD: 59.6 %
NITRITE UR QL STRIP.AUTO: NEGATIVE
OPIATES UR QL SCN>300 NG/ML: ABNORMAL
OXYCODONE UR QL SCN: ABNORMAL
PCP UR QL SCN>25 NG/ML: ABNORMAL
PH UR STRIP.AUTO: 6.5 [PH] (ref 5–8)
PH UR STRIP: 6.5 [PH]
PLATELET # BLD AUTO: 173 K/UL (ref 135–450)
PMV BLD AUTO: 7.1 FL (ref 5–10.5)
POTASSIUM SERPL-SCNC: 3.8 MMOL/L (ref 3.5–5.1)
PROT SERPL-MCNC: 7.5 G/DL (ref 6.4–8.2)
PROT UR STRIP.AUTO-MCNC: NEGATIVE MG/DL
RBC # BLD AUTO: 5.12 M/UL (ref 4.2–5.9)
SODIUM SERPL-SCNC: 137 MMOL/L (ref 136–145)
SP GR UR STRIP.AUTO: 1.01 (ref 1–1.03)
UA COMPLETE W REFLEX CULTURE PNL UR: NORMAL
UA DIPSTICK W REFLEX MICRO PNL UR: NORMAL
URN SPEC COLLECT METH UR: NORMAL
UROBILINOGEN UR STRIP-ACNC: 0.2 E.U./DL
WBC # BLD AUTO: 9.6 K/UL (ref 4–11)

## 2025-02-04 PROCEDURE — 96374 THER/PROPH/DIAG INJ IV PUSH: CPT

## 2025-02-04 PROCEDURE — 80307 DRUG TEST PRSMV CHEM ANLYZR: CPT

## 2025-02-04 PROCEDURE — 81003 URINALYSIS AUTO W/O SCOPE: CPT

## 2025-02-04 PROCEDURE — 36415 COLL VENOUS BLD VENIPUNCTURE: CPT

## 2025-02-04 PROCEDURE — 85025 COMPLETE CBC W/AUTO DIFF WBC: CPT

## 2025-02-04 PROCEDURE — 6370000000 HC RX 637 (ALT 250 FOR IP): Performed by: STUDENT IN AN ORGANIZED HEALTH CARE EDUCATION/TRAINING PROGRAM

## 2025-02-04 PROCEDURE — 82077 ASSAY SPEC XCP UR&BREATH IA: CPT

## 2025-02-04 PROCEDURE — 6360000002 HC RX W HCPCS: Performed by: STUDENT IN AN ORGANIZED HEALTH CARE EDUCATION/TRAINING PROGRAM

## 2025-02-04 PROCEDURE — 83690 ASSAY OF LIPASE: CPT

## 2025-02-04 PROCEDURE — 99285 EMERGENCY DEPT VISIT HI MDM: CPT

## 2025-02-04 PROCEDURE — 80053 COMPREHEN METABOLIC PANEL: CPT

## 2025-02-04 RX ORDER — FLUOXETINE 10 MG/1
CAPSULE ORAL
COMMUNITY
Start: 2025-01-30

## 2025-02-04 RX ORDER — LORAZEPAM 0.5 MG/1
0.5 TABLET ORAL ONCE
Status: COMPLETED | OUTPATIENT
Start: 2025-02-04 | End: 2025-02-04

## 2025-02-04 RX ORDER — ONDANSETRON 2 MG/ML
4 INJECTION INTRAMUSCULAR; INTRAVENOUS ONCE
Status: COMPLETED | OUTPATIENT
Start: 2025-02-04 | End: 2025-02-04

## 2025-02-04 RX ORDER — LANOLIN ALCOHOL/MO/W.PET/CERES
CREAM (GRAM) TOPICAL
COMMUNITY
Start: 2025-02-03

## 2025-02-04 RX ADMIN — ONDANSETRON 4 MG: 2 INJECTION, SOLUTION INTRAMUSCULAR; INTRAVENOUS at 23:12

## 2025-02-04 RX ADMIN — LORAZEPAM 0.5 MG: 0.5 TABLET ORAL at 23:13

## 2025-02-04 ASSESSMENT — PAIN - FUNCTIONAL ASSESSMENT: PAIN_FUNCTIONAL_ASSESSMENT: NONE - DENIES PAIN

## 2025-02-04 ASSESSMENT — ENCOUNTER SYMPTOMS
VOMITING: 1
NAUSEA: 1

## 2025-02-05 LAB
ANION GAP SERPL CALCULATED.3IONS-SCNC: 13 MMOL/L (ref 3–16)
BUN SERPL-MCNC: 12 MG/DL (ref 7–20)
CALCIUM SERPL-MCNC: 8.5 MG/DL (ref 8.3–10.6)
CHLORIDE SERPL-SCNC: 102 MMOL/L (ref 99–110)
CO2 SERPL-SCNC: 23 MMOL/L (ref 21–32)
CREAT SERPL-MCNC: 0.8 MG/DL (ref 0.9–1.3)
DEPRECATED RDW RBC AUTO: 16.1 % (ref 12.4–15.4)
GFR SERPLBLD CREATININE-BSD FMLA CKD-EPI: >90 ML/MIN/{1.73_M2}
GLUCOSE SERPL-MCNC: 104 MG/DL (ref 70–99)
HCT VFR BLD AUTO: 41.3 % (ref 40.5–52.5)
HGB BLD-MCNC: 14.1 G/DL (ref 13.5–17.5)
MAGNESIUM SERPL-MCNC: 2.36 MG/DL (ref 1.8–2.4)
MCH RBC QN AUTO: 28.5 PG (ref 26–34)
MCHC RBC AUTO-ENTMCNC: 34.1 G/DL (ref 31–36)
MCV RBC AUTO: 83.5 FL (ref 80–100)
PHOSPHATE SERPL-MCNC: 2.4 MG/DL (ref 2.5–4.9)
PLATELET # BLD AUTO: 167 K/UL (ref 135–450)
PMV BLD AUTO: 7.8 FL (ref 5–10.5)
POTASSIUM SERPL-SCNC: 3.8 MMOL/L (ref 3.5–5.1)
RBC # BLD AUTO: 4.94 M/UL (ref 4.2–5.9)
SODIUM SERPL-SCNC: 138 MMOL/L (ref 136–145)
WBC # BLD AUTO: 8.5 K/UL (ref 4–11)

## 2025-02-05 PROCEDURE — 2580000003 HC RX 258: Performed by: STUDENT IN AN ORGANIZED HEALTH CARE EDUCATION/TRAINING PROGRAM

## 2025-02-05 PROCEDURE — 2060000000 HC ICU INTERMEDIATE R&B

## 2025-02-05 PROCEDURE — 6370000000 HC RX 637 (ALT 250 FOR IP): Performed by: STUDENT IN AN ORGANIZED HEALTH CARE EDUCATION/TRAINING PROGRAM

## 2025-02-05 PROCEDURE — 84100 ASSAY OF PHOSPHORUS: CPT

## 2025-02-05 PROCEDURE — 2500000003 HC RX 250 WO HCPCS

## 2025-02-05 PROCEDURE — 6370000000 HC RX 637 (ALT 250 FOR IP): Performed by: NURSE PRACTITIONER

## 2025-02-05 PROCEDURE — 2500000003 HC RX 250 WO HCPCS: Performed by: STUDENT IN AN ORGANIZED HEALTH CARE EDUCATION/TRAINING PROGRAM

## 2025-02-05 PROCEDURE — 2000000000 HC ICU R&B

## 2025-02-05 PROCEDURE — 5A09357 ASSISTANCE WITH RESPIRATORY VENTILATION, LESS THAN 24 CONSECUTIVE HOURS, CONTINUOUS POSITIVE AIRWAY PRESSURE: ICD-10-PCS

## 2025-02-05 PROCEDURE — 80048 BASIC METABOLIC PNL TOTAL CA: CPT

## 2025-02-05 PROCEDURE — 85027 COMPLETE CBC AUTOMATED: CPT

## 2025-02-05 PROCEDURE — 36415 COLL VENOUS BLD VENIPUNCTURE: CPT

## 2025-02-05 PROCEDURE — 6370000000 HC RX 637 (ALT 250 FOR IP)

## 2025-02-05 PROCEDURE — 83735 ASSAY OF MAGNESIUM: CPT

## 2025-02-05 PROCEDURE — 2580000003 HC RX 258

## 2025-02-05 PROCEDURE — 94660 CPAP INITIATION&MGMT: CPT

## 2025-02-05 RX ORDER — FOLIC ACID 1 MG/1
1 TABLET ORAL DAILY
Status: DISCONTINUED | OUTPATIENT
Start: 2025-02-05 | End: 2025-02-06 | Stop reason: HOSPADM

## 2025-02-05 RX ORDER — NICOTINE 21 MG/24HR
1 PATCH, TRANSDERMAL 24 HOURS TRANSDERMAL DAILY
Status: DISCONTINUED | OUTPATIENT
Start: 2025-02-05 | End: 2025-02-06 | Stop reason: HOSPADM

## 2025-02-05 RX ORDER — LORAZEPAM 2 MG/ML
1 CONCENTRATE ORAL EVERY 8 HOURS PRN
Status: DISCONTINUED | OUTPATIENT
Start: 2025-02-05 | End: 2025-02-06 | Stop reason: HOSPADM

## 2025-02-05 RX ORDER — DEXTROSE, SODIUM CHLORIDE, SODIUM LACTATE, POTASSIUM CHLORIDE, AND CALCIUM CHLORIDE 5; .6; .31; .03; .02 G/100ML; G/100ML; G/100ML; G/100ML; G/100ML
INJECTION, SOLUTION INTRAVENOUS CONTINUOUS
Status: DISCONTINUED | OUTPATIENT
Start: 2025-02-05 | End: 2025-02-06 | Stop reason: HOSPADM

## 2025-02-05 RX ORDER — ONDANSETRON 4 MG/1
4 TABLET, ORALLY DISINTEGRATING ORAL EVERY 8 HOURS PRN
Status: DISCONTINUED | OUTPATIENT
Start: 2025-02-05 | End: 2025-02-06 | Stop reason: HOSPADM

## 2025-02-05 RX ORDER — POTASSIUM CHLORIDE 1500 MG/1
40 TABLET, EXTENDED RELEASE ORAL PRN
Status: DISCONTINUED | OUTPATIENT
Start: 2025-02-05 | End: 2025-02-06 | Stop reason: HOSPADM

## 2025-02-05 RX ORDER — PHENOBARBITAL 64.8 MG/1
64.8 TABLET ORAL 4 TIMES DAILY
Status: DISCONTINUED | OUTPATIENT
Start: 2025-02-05 | End: 2025-02-05

## 2025-02-05 RX ORDER — MAGNESIUM SULFATE IN WATER 40 MG/ML
2000 INJECTION, SOLUTION INTRAVENOUS PRN
Status: DISCONTINUED | OUTPATIENT
Start: 2025-02-05 | End: 2025-02-06 | Stop reason: HOSPADM

## 2025-02-05 RX ORDER — MUPIROCIN 20 MG/G
OINTMENT TOPICAL 2 TIMES DAILY
Status: DISCONTINUED | OUTPATIENT
Start: 2025-02-05 | End: 2025-02-06 | Stop reason: HOSPADM

## 2025-02-05 RX ORDER — SODIUM CHLORIDE 0.9 % (FLUSH) 0.9 %
5-40 SYRINGE (ML) INJECTION EVERY 12 HOURS SCHEDULED
Status: DISCONTINUED | OUTPATIENT
Start: 2025-02-05 | End: 2025-02-06 | Stop reason: HOSPADM

## 2025-02-05 RX ORDER — PHENOBARBITAL 64.8 MG/1
32.4 TABLET ORAL 4 TIMES DAILY
Status: DISCONTINUED | OUTPATIENT
Start: 2025-02-06 | End: 2025-02-05

## 2025-02-05 RX ORDER — LORAZEPAM 1 MG/1
2 TABLET ORAL
Status: DISCONTINUED | OUTPATIENT
Start: 2025-02-05 | End: 2025-02-06 | Stop reason: HOSPADM

## 2025-02-05 RX ORDER — LORAZEPAM 1 MG/1
4 TABLET ORAL
Status: DISCONTINUED | OUTPATIENT
Start: 2025-02-05 | End: 2025-02-06 | Stop reason: HOSPADM

## 2025-02-05 RX ORDER — SODIUM CHLORIDE 9 MG/ML
INJECTION, SOLUTION INTRAVENOUS PRN
Status: DISCONTINUED | OUTPATIENT
Start: 2025-02-05 | End: 2025-02-06 | Stop reason: HOSPADM

## 2025-02-05 RX ORDER — PHENOBARBITAL 15 MG/1
16.2 TABLET ORAL 2 TIMES DAILY
Status: DISCONTINUED | OUTPATIENT
Start: 2025-02-08 | End: 2025-02-05

## 2025-02-05 RX ORDER — SODIUM CHLORIDE 0.9 % (FLUSH) 0.9 %
5-40 SYRINGE (ML) INJECTION PRN
Status: DISCONTINUED | OUTPATIENT
Start: 2025-02-05 | End: 2025-02-06 | Stop reason: HOSPADM

## 2025-02-05 RX ORDER — PHENOBARBITAL 64.8 MG/1
32.4 TABLET ORAL 2 TIMES DAILY
Status: DISCONTINUED | OUTPATIENT
Start: 2025-02-07 | End: 2025-02-05

## 2025-02-05 RX ORDER — LORAZEPAM 1 MG/1
3 TABLET ORAL
Status: DISCONTINUED | OUTPATIENT
Start: 2025-02-05 | End: 2025-02-06 | Stop reason: HOSPADM

## 2025-02-05 RX ORDER — PHENOBARBITAL 64.8 MG/1
32.4 TABLET ORAL EVERY 6 HOURS PRN
Status: DISCONTINUED | OUTPATIENT
Start: 2025-02-06 | End: 2025-02-05

## 2025-02-05 RX ORDER — POTASSIUM CHLORIDE 7.45 MG/ML
10 INJECTION INTRAVENOUS PRN
Status: DISCONTINUED | OUTPATIENT
Start: 2025-02-05 | End: 2025-02-06 | Stop reason: HOSPADM

## 2025-02-05 RX ORDER — DEXMEDETOMIDINE HYDROCHLORIDE 4 UG/ML
.1-1.5 INJECTION, SOLUTION INTRAVENOUS CONTINUOUS
Status: DISCONTINUED | OUTPATIENT
Start: 2025-02-05 | End: 2025-02-06 | Stop reason: HOSPADM

## 2025-02-05 RX ORDER — PHENOBARBITAL 64.8 MG/1
64.8 TABLET ORAL EVERY 6 HOURS PRN
Status: DISCONTINUED | OUTPATIENT
Start: 2025-02-05 | End: 2025-02-05

## 2025-02-05 RX ORDER — M-VIT,TX,IRON,MINS/CALC/FOLIC 27MG-0.4MG
1 TABLET ORAL DAILY
Status: DISCONTINUED | OUTPATIENT
Start: 2025-02-05 | End: 2025-02-06 | Stop reason: HOSPADM

## 2025-02-05 RX ORDER — ONDANSETRON 2 MG/ML
4 INJECTION INTRAMUSCULAR; INTRAVENOUS EVERY 6 HOURS PRN
Status: DISCONTINUED | OUTPATIENT
Start: 2025-02-05 | End: 2025-02-06 | Stop reason: HOSPADM

## 2025-02-05 RX ORDER — ENOXAPARIN SODIUM 100 MG/ML
30 INJECTION SUBCUTANEOUS 2 TIMES DAILY
Status: DISCONTINUED | OUTPATIENT
Start: 2025-02-05 | End: 2025-02-06 | Stop reason: HOSPADM

## 2025-02-05 RX ORDER — LANOLIN ALCOHOL/MO/W.PET/CERES
100 CREAM (GRAM) TOPICAL DAILY
Status: DISCONTINUED | OUTPATIENT
Start: 2025-02-05 | End: 2025-02-06 | Stop reason: HOSPADM

## 2025-02-05 RX ORDER — ACETAMINOPHEN 325 MG/1
650 TABLET ORAL EVERY 6 HOURS PRN
Status: DISCONTINUED | OUTPATIENT
Start: 2025-02-05 | End: 2025-02-06 | Stop reason: HOSPADM

## 2025-02-05 RX ORDER — FLUOXETINE 10 MG/1
10 CAPSULE ORAL DAILY
Status: DISCONTINUED | OUTPATIENT
Start: 2025-02-05 | End: 2025-02-06 | Stop reason: HOSPADM

## 2025-02-05 RX ORDER — ACETAMINOPHEN 650 MG/1
650 SUPPOSITORY RECTAL EVERY 6 HOURS PRN
Status: DISCONTINUED | OUTPATIENT
Start: 2025-02-05 | End: 2025-02-06 | Stop reason: HOSPADM

## 2025-02-05 RX ORDER — LORAZEPAM 1 MG/1
1 TABLET ORAL
Status: DISCONTINUED | OUTPATIENT
Start: 2025-02-05 | End: 2025-02-06 | Stop reason: HOSPADM

## 2025-02-05 RX ORDER — POLYETHYLENE GLYCOL 3350 17 G/17G
17 POWDER, FOR SOLUTION ORAL DAILY PRN
Status: DISCONTINUED | OUTPATIENT
Start: 2025-02-05 | End: 2025-02-06 | Stop reason: HOSPADM

## 2025-02-05 RX ORDER — PHENOBARBITAL 15 MG/1
16.2 TABLET ORAL EVERY 6 HOURS PRN
Status: DISCONTINUED | OUTPATIENT
Start: 2025-02-08 | End: 2025-02-05

## 2025-02-05 RX ADMIN — Medication 1 TABLET: at 07:56

## 2025-02-05 RX ADMIN — LORAZEPAM 1 MG: 1 TABLET ORAL at 09:53

## 2025-02-05 RX ADMIN — LORAZEPAM 1 MG: 1 TABLET ORAL at 12:54

## 2025-02-05 RX ADMIN — LORAZEPAM 1 MG: 1 TABLET ORAL at 02:54

## 2025-02-05 RX ADMIN — Medication 0.2 MCG/KG/HR: at 04:26

## 2025-02-05 RX ADMIN — SODIUM CHLORIDE, SODIUM LACTATE, POTASSIUM CHLORIDE, CALCIUM CHLORIDE AND DEXTROSE MONOHYDRATE: 5; 600; 310; 30; 20 INJECTION, SOLUTION INTRAVENOUS at 01:46

## 2025-02-05 RX ADMIN — LORAZEPAM 1 MG: 1 TABLET ORAL at 20:12

## 2025-02-05 RX ADMIN — SODIUM CHLORIDE, PRESERVATIVE FREE 20 ML: 5 INJECTION INTRAVENOUS at 07:58

## 2025-02-05 RX ADMIN — LORAZEPAM 1 MG: 1 TABLET ORAL at 14:50

## 2025-02-05 RX ADMIN — SODIUM CHLORIDE, PRESERVATIVE FREE 10 ML: 5 INJECTION INTRAVENOUS at 20:11

## 2025-02-05 RX ADMIN — Medication 100 MG: at 07:56

## 2025-02-05 RX ADMIN — Medication 0.6 MCG/KG/HR: at 21:47

## 2025-02-05 RX ADMIN — MUPIROCIN: 20 OINTMENT TOPICAL at 09:48

## 2025-02-05 RX ADMIN — LORAZEPAM 1 MG: 1 TABLET ORAL at 22:01

## 2025-02-05 RX ADMIN — SODIUM CHLORIDE, SODIUM LACTATE, POTASSIUM CHLORIDE, CALCIUM CHLORIDE AND DEXTROSE MONOHYDRATE: 5; 600; 310; 30; 20 INJECTION, SOLUTION INTRAVENOUS at 22:03

## 2025-02-05 RX ADMIN — FLUOXETINE HYDROCHLORIDE 10 MG: 10 CAPSULE ORAL at 09:48

## 2025-02-05 RX ADMIN — FOLIC ACID 1 MG: 1 TABLET ORAL at 07:56

## 2025-02-05 RX ADMIN — POTASSIUM PHOSPHATE, MONOBASIC AND POTASSIUM PHOSPHATE, DIBASIC 15 MMOL: 224; 236 INJECTION, SOLUTION, CONCENTRATE INTRAVENOUS at 09:48

## 2025-02-05 ASSESSMENT — LIFESTYLE VARIABLES
HOW MANY STANDARD DRINKS CONTAINING ALCOHOL DO YOU HAVE ON A TYPICAL DAY: 10 OR MORE
HOW OFTEN DO YOU HAVE A DRINK CONTAINING ALCOHOL: 4 OR MORE TIMES A WEEK

## 2025-02-05 NOTE — ED PROVIDER NOTES
Emergency Department Provider Note  Location: [facility] EMERGENCY DEPARTMENT  2/4/2025     Patient Identification  Rod Herring is a 54 y.o. male    Chief Complaint  Alcohol Problem (Pt reports over the past 4 days has been drinking 30 pack per day. Pt reports this is his 5th attempt to quit drinking alcohol. Pt reports last consumption was within the past hour prior to arrival. )      Mode of Arrival  private car    HPI  (History provided by patient)  This is a 54 y.o. male with a PMH significant for alcohol abuse  presented today for detox.  Patient reports that over the past 4 days he has been drinking 3 packs/day.  He reports that this is his fifth attempt to quit drinking alcohol and he is ready to do it for good.  He reports that his last consumption was within the past hour prior to arrival.  He denies any known seizures.  He does report issues with nausea and vomiting.  He reports feeling weak but denies any significant abdominal pain.  Denies any hematemesis or bloody stool.  Denies any chest pain or shortness of breath.  Patient reports that due to his anxiety he has been using alcohol to help calm this.    ROS  Review of Systems   Gastrointestinal:  Positive for nausea and vomiting.   All other systems reviewed and are negative.        I have reviewed the following nursing documentation:  Allergies: No Known Allergies    Past medical history:  has a past medical history of Bronchitis, Depression, ETOH abuse, and Prostate enlargement.    Past surgical history:  has a past surgical history that includes Leg Surgery and Tonsillectomy.    Home medications:   Prior to Admission medications    Medication Sig Start Date End Date Taking? Authorizing Provider   FLUoxetine (PROZAC) 10 MG capsule  1/30/25  Yes ProviderArsalan MD   thiamine 100 MG tablet  2/3/25  Yes ProviderArsalan MD   diazePAM (VALIUM) 5 MG tablet Take 1 tablet by mouth every 6 hours as needed for Anxiety. Max Daily Amount: 20

## 2025-02-05 NOTE — H&P
V2.0  History and Physical      Name:  Rod Herring /Age/Sex: 1970  (54 y.o. male)   MRN & CSN:  [id number] & [id number] Encounter Date/Time: 2025 12:36 AM EST   Location:   PCP: Alexei Aguirre MD       Hospital Day: 2    Assessment and Plan:   Rod Herring is a 54 y.o. male  who presents with Alcohol abuse    Hospital Problems             Last Modified POA    * (Principal) Alcohol abuse 2025 Yes         Assessment and Plan:  Alcohol intoxication impending withdrawals.  Drinks 3 packs a day for the last 4 days.  Has been drinking significantly for the last 1 month.  Feels guilty about it and is intending to quit drinking.  Does have realization that the patient is \"alcoholic \"as per the patient.  Will start the patient on CIWA protocol with phenobarb thiamine with fluid hydration monitor for hypoglycemia monitor electrolytes replete as needed.  Currently on aspiration and seizure precautions.  Admitted to stepdown unit initially but concerns for seizures and pending bronc with aggressive signs of withdrawals.  Started on Precedex drip and transferred to ICU..  Denies any fall or trauma to the head  Transaminitis in the setting of above monitor LFTs on a regular basis  Mood disorder on fluoxetine at home    Medical Decision Making:  The following items were considered in medical decision making:  Discussion of patient care with other providers  Reviewed clinical lab tests if any  Reviewed radiology tests if any  Reviewed other diagnostic tests/interventions  Independent review of radiologic images if any  Microbiology cultures and other micro tests if any    Estimated time spent for medical decision-making encompassing complexity of the case, history taking, medication review, physical examination, communication with family, RN, , discussion with specialists, and ancillary staff members to provide accurate care for the patient was [id number] minutes.    MDM (any 2 required for

## 2025-02-05 NOTE — CARE COORDINATION
Case Management Assessment  Initial Evaluation    Date/Time of Evaluation: 2/5/2025 1:19 PM  Assessment Completed by: Maye Zhu RN    If patient is discharged prior to next notation, then this note serves as note for discharge by case management.    Patient Name: Rod Herring                   YOB: 1970  Diagnosis: Alcohol abuse [F10.10]  Nausea [R11.0]  Essential hypertension [I10]  Elevated LFTs [R79.89]                   Date / Time: 2/4/2025 10:30 PM    Patient Admission Status: Inpatient   Readmission Risk (Low < 19, Mod (19-27), High > 27): Readmission Risk Score: 8.2    Current PCP: Alexei Aguirre MD  PCP verified by CM? Yes    Chart Reviewed: Yes      History Provided by: Patient  Patient Orientation: Alert and Oriented    Patient Cognition: Alert    Hospitalization in the last 30 days (Readmission):  No    If yes, Readmission Assessment in CM Navigator will be completed.    Advance Directives:      Code Status: Full Code   Patient's Primary Decision Maker is: Legal Next of Kin    Primary Decision Maker: Farheen Herring - Parent - [phone]    Secondary Decision Maker: Mary Herring - Other - [phone]    Supplemental (Other) Decision Maker: Bhaskar Herring - Parent - [phone]    Discharge Planning:    Patient lives with: Parent Type of Home: Other (Comment) (condo with parents)  Primary Care Giver: Self  Patient Support Systems include: Parent, Family Members   Current Financial resources: Medicaid  Current community resources: None  Current services prior to admission: C-pap            Current DME:              Type of Home Care services:  None    ADLS  Prior functional level: Independent in ADLs/IADLs  Current functional level: Independent in ADLs/IADLs    PT AM-PAC:   /24  OT AM-PAC:   /24    Family can provide assistance at DC: Yes  Would you like Case Management to discuss the discharge plan with any other family members/significant others, and if so, who? No  Plans to

## 2025-02-05 NOTE — PLAN OF CARE
Problem: Discharge Planning  Goal: Discharge to home or other facility with appropriate resources  2/5/2025 0549 by Salena Cervantes RN  Outcome: Progressing  2/5/2025 0548 by Salena Cervantes RN  Outcome: Progressing  Flowsheets (Taken 2/5/2025 0515)  Discharge to home or other facility with appropriate resources:   Identify barriers to discharge with patient and caregiver   Refer to discharge planning if patient needs post-hospital services based on physician order or complex needs related to functional status, cognitive ability or social support system   Identify discharge learning needs (meds, wound care, etc)   Arrange for needed discharge resources and transportation as appropriate   Arrange for interpreters to assist at discharge as needed     Problem: Safety - Adult  Goal: Free from fall injury  2/5/2025 0549 by Salena Cervantes RN  Outcome: Progressing  2/5/2025 0548 by Salena Cervantes RN  Outcome: Progressing     Problem: ABCDS Injury Assessment  Goal: Absence of physical injury  Outcome: Progressing     Problem: Pain  Goal: Verbalizes/displays adequate comfort level or baseline comfort level  Outcome: Progressing     Problem: Skin/Tissue Integrity  Goal: Skin integrity remains intact  Description: 1.  Monitor for areas of redness and/or skin breakdown  2.  Assess vascular access sites hourly  3.  Every 4-6 hours minimum:  Change oxygen saturation probe site  4.  Every 4-6 hours:  If on nasal continuous positive airway pressure, respiratory therapy assess nares and determine need for appliance change or resting period  Outcome: Progressing

## 2025-02-05 NOTE — ED NOTES
Rod Herring is a 54 y.o. male admitted for  Principal Problem:    Alcohol abuse  Resolved Problems:    * No resolved hospital problems. *  .   Patient Home via self with   Chief Complaint   Patient presents with    Alcohol Problem     Pt reports over the past 4 days has been drinking 30 pack per day. Pt reports this is his 5th attempt to quit drinking alcohol. Pt reports last consumption was within the past hour prior to arrival.    .  Patient is alert and Person, Place, Time, and Situation  Patient's baseline mobility: Baseline Mobility: Independent   Code Status: Prior   Cardiac Rhythm:       Is patient on baseline Oxygen: no how many Liters:   Abnormal Assessment Findings: none    Isolation: None      NIH Score:    C-SSRS: Risk of Suicide: No Risk  Bedside swallow:        Active LDA's:   Peripheral IV 02/04/25 Right Antecubital (Active)     Patient admitted with a artis:  If the artis is chronic was it exchanged:  Reason for artis:   Patient admitted with Central Line:  . PICC line placement confirmed: YES OR NO:[id number]}   Reason for Central line:   Was central line Inserted from an outside facility:        Family/Caregiver Present no Any Concerns:    Restraints no  Sitter no         Vitals: MEWS Score: 1    Vitals:    02/04/25 2237 02/04/25 2319 02/05/25 0047   BP: (!) 171/95 (!) 152/85 (!) 153/81   Pulse: 98 89 87   Resp: 18 13 16   Temp: 97.9 °F (36.6 °C)     TempSrc: Oral     SpO2: 98% 95% 97%   Weight: 107 kg (236 lb)     Height: 1.778 m (5' 10\")         Last documented pain score (0-10 scale)    Pain medication administered No.    Pertinent or High Risk Medications/Drips: No.    Pending Blood Product Administration: no    Abnormal labs:   Abnormal Labs Reviewed   CBC WITH AUTO DIFFERENTIAL - Abnormal; Notable for the following components:       Result Value    RDW 15.9 (*)     All other components within normal limits   COMPREHENSIVE METABOLIC PANEL W/ REFLEX TO MG FOR LOW K - Abnormal; Notable for the

## 2025-02-06 VITALS
HEART RATE: 75 BPM | DIASTOLIC BLOOD PRESSURE: 82 MMHG | SYSTOLIC BLOOD PRESSURE: 145 MMHG | OXYGEN SATURATION: 96 % | WEIGHT: 232.2 LBS | BODY MASS INDEX: 33.24 KG/M2 | HEIGHT: 70 IN | RESPIRATION RATE: 16 BRPM | TEMPERATURE: 98.1 F

## 2025-02-06 LAB
ALBUMIN SERPL-MCNC: 3.8 G/DL (ref 3.4–5)
ALBUMIN/GLOB SERPL: 1.2 {RATIO} (ref 1.1–2.2)
ALP SERPL-CCNC: 143 U/L (ref 40–129)
ALT SERPL-CCNC: 99 U/L (ref 10–40)
ANION GAP SERPL CALCULATED.3IONS-SCNC: 10 MMOL/L (ref 3–16)
AST SERPL-CCNC: 116 U/L (ref 15–37)
BASOPHILS # BLD: 0.1 K/UL (ref 0–0.2)
BASOPHILS NFR BLD: 0.8 %
BILIRUB SERPL-MCNC: 1.9 MG/DL (ref 0–1)
BUN SERPL-MCNC: 9 MG/DL (ref 7–20)
CALCIUM SERPL-MCNC: 8.8 MG/DL (ref 8.3–10.6)
CHLORIDE SERPL-SCNC: 104 MMOL/L (ref 99–110)
CO2 SERPL-SCNC: 23 MMOL/L (ref 21–32)
CREAT SERPL-MCNC: 0.8 MG/DL (ref 0.9–1.3)
DEPRECATED RDW RBC AUTO: 16.1 % (ref 12.4–15.4)
EOSINOPHIL # BLD: 0.2 K/UL (ref 0–0.6)
EOSINOPHIL NFR BLD: 3.7 %
GFR SERPLBLD CREATININE-BSD FMLA CKD-EPI: >90 ML/MIN/{1.73_M2}
GLUCOSE SERPL-MCNC: 169 MG/DL (ref 70–99)
HCT VFR BLD AUTO: 38.8 % (ref 40.5–52.5)
HGB BLD-MCNC: 13.3 G/DL (ref 13.5–17.5)
LYMPHOCYTES # BLD: 2.4 K/UL (ref 1–5.1)
LYMPHOCYTES NFR BLD: 35.1 %
MAGNESIUM SERPL-MCNC: 2.41 MG/DL (ref 1.8–2.4)
MCH RBC QN AUTO: 29 PG (ref 26–34)
MCHC RBC AUTO-ENTMCNC: 34.3 G/DL (ref 31–36)
MCV RBC AUTO: 84.5 FL (ref 80–100)
MONOCYTES # BLD: 0.6 K/UL (ref 0–1.3)
MONOCYTES NFR BLD: 8.3 %
NEUTROPHILS # BLD: 3.5 K/UL (ref 1.7–7.7)
NEUTROPHILS NFR BLD: 52.1 %
PHOSPHATE SERPL-MCNC: 2.5 MG/DL (ref 2.5–4.9)
PLATELET # BLD AUTO: 138 K/UL (ref 135–450)
PMV BLD AUTO: 7.5 FL (ref 5–10.5)
POTASSIUM SERPL-SCNC: 3.4 MMOL/L (ref 3.5–5.1)
PROT SERPL-MCNC: 6.9 G/DL (ref 6.4–8.2)
RBC # BLD AUTO: 4.6 M/UL (ref 4.2–5.9)
SODIUM SERPL-SCNC: 137 MMOL/L (ref 136–145)
WBC # BLD AUTO: 6.8 K/UL (ref 4–11)

## 2025-02-06 PROCEDURE — 2500000003 HC RX 250 WO HCPCS

## 2025-02-06 PROCEDURE — 6370000000 HC RX 637 (ALT 250 FOR IP)

## 2025-02-06 PROCEDURE — 2580000003 HC RX 258: Performed by: STUDENT IN AN ORGANIZED HEALTH CARE EDUCATION/TRAINING PROGRAM

## 2025-02-06 PROCEDURE — 97166 OT EVAL MOD COMPLEX 45 MIN: CPT

## 2025-02-06 PROCEDURE — 97535 SELF CARE MNGMENT TRAINING: CPT

## 2025-02-06 PROCEDURE — 80053 COMPREHEN METABOLIC PANEL: CPT

## 2025-02-06 PROCEDURE — 85025 COMPLETE CBC W/AUTO DIFF WBC: CPT

## 2025-02-06 PROCEDURE — 97116 GAIT TRAINING THERAPY: CPT

## 2025-02-06 PROCEDURE — 97530 THERAPEUTIC ACTIVITIES: CPT

## 2025-02-06 PROCEDURE — 97162 PT EVAL MOD COMPLEX 30 MIN: CPT

## 2025-02-06 PROCEDURE — 6360000002 HC RX W HCPCS: Performed by: STUDENT IN AN ORGANIZED HEALTH CARE EDUCATION/TRAINING PROGRAM

## 2025-02-06 PROCEDURE — 2580000003 HC RX 258

## 2025-02-06 PROCEDURE — 36415 COLL VENOUS BLD VENIPUNCTURE: CPT

## 2025-02-06 PROCEDURE — 83735 ASSAY OF MAGNESIUM: CPT

## 2025-02-06 PROCEDURE — 2500000003 HC RX 250 WO HCPCS: Performed by: STUDENT IN AN ORGANIZED HEALTH CARE EDUCATION/TRAINING PROGRAM

## 2025-02-06 PROCEDURE — 84100 ASSAY OF PHOSPHORUS: CPT

## 2025-02-06 PROCEDURE — 6370000000 HC RX 637 (ALT 250 FOR IP): Performed by: NURSE PRACTITIONER

## 2025-02-06 PROCEDURE — 6370000000 HC RX 637 (ALT 250 FOR IP): Performed by: STUDENT IN AN ORGANIZED HEALTH CARE EDUCATION/TRAINING PROGRAM

## 2025-02-06 RX ORDER — POLYVINYL ALCOHOL 14 MG/ML
1 SOLUTION/ DROPS OPHTHALMIC 2 TIMES DAILY
Status: DISCONTINUED | OUTPATIENT
Start: 2025-02-06 | End: 2025-02-06 | Stop reason: HOSPADM

## 2025-02-06 RX ORDER — POTASSIUM CHLORIDE 1500 MG/1
40 TABLET, EXTENDED RELEASE ORAL ONCE
Status: COMPLETED | OUTPATIENT
Start: 2025-02-06 | End: 2025-02-06

## 2025-02-06 RX ADMIN — POLYVINYL ALCOHOL 1 DROP: 1.4 SOLUTION/ DROPS OPHTHALMIC at 10:30

## 2025-02-06 RX ADMIN — Medication 100 MG: at 07:57

## 2025-02-06 RX ADMIN — LORAZEPAM 2 MG: 1 TABLET ORAL at 11:29

## 2025-02-06 RX ADMIN — LORAZEPAM 1 MG: 1 TABLET ORAL at 00:49

## 2025-02-06 RX ADMIN — FLUOXETINE HYDROCHLORIDE 10 MG: 10 CAPSULE ORAL at 11:30

## 2025-02-06 RX ADMIN — SODIUM CHLORIDE, PRESERVATIVE FREE 10 ML: 5 INJECTION INTRAVENOUS at 07:57

## 2025-02-06 RX ADMIN — SODIUM CHLORIDE, SODIUM LACTATE, POTASSIUM CHLORIDE, CALCIUM CHLORIDE AND DEXTROSE MONOHYDRATE: 5; 600; 310; 30; 20 INJECTION, SOLUTION INTRAVENOUS at 06:46

## 2025-02-06 RX ADMIN — MUPIROCIN: 20 OINTMENT TOPICAL at 07:57

## 2025-02-06 RX ADMIN — POTASSIUM CHLORIDE 40 MEQ: 1500 TABLET, EXTENDED RELEASE ORAL at 10:29

## 2025-02-06 RX ADMIN — Medication 0.6 MCG/KG/HR: at 04:06

## 2025-02-06 RX ADMIN — LORAZEPAM 2 MG: 1 TABLET ORAL at 07:57

## 2025-02-06 RX ADMIN — Medication 1 TABLET: at 07:57

## 2025-02-06 RX ADMIN — LORAZEPAM 1 MG: 1 TABLET ORAL at 10:29

## 2025-02-06 RX ADMIN — LORAZEPAM 2 MG: 1 TABLET ORAL at 13:12

## 2025-02-06 RX ADMIN — FOLIC ACID 1 MG: 1 TABLET ORAL at 07:57

## 2025-02-06 RX ADMIN — LORAZEPAM 1 MG: 1 TABLET ORAL at 04:01

## 2025-02-06 RX ADMIN — POTASSIUM PHOSPHATE, MONOBASIC AND POTASSIUM PHOSPHATE, DIBASIC 15 MMOL: 224; 236 INJECTION, SOLUTION, CONCENTRATE INTRAVENOUS at 10:34

## 2025-02-06 ASSESSMENT — PAIN SCALES - GENERAL
PAINLEVEL_OUTOF10: 0
PAINLEVEL_OUTOF10: 0

## 2025-02-06 NOTE — PLAN OF CARE
Problem: Discharge Planning  Goal: Discharge to home or other facility with appropriate resources  2/6/2025 0954 by Bharat Kate RN  Outcome: Progressing  2/5/2025 2353 by Livia Grover RN  Outcome: Progressing     Problem: Safety - Adult  Goal: Free from fall injury  2/6/2025 0954 by Bharat Kate RN  Outcome: Progressing  2/5/2025 2353 by Livia Grover RN  Outcome: Progressing     Problem: ABCDS Injury Assessment  Goal: Absence of physical injury  2/6/2025 0954 by Bharat Kate RN  Outcome: Progressing  2/5/2025 2353 by Livia Grover RN  Outcome: Progressing     Problem: Pain  Goal: Verbalizes/displays adequate comfort level or baseline comfort level  2/6/2025 0954 by Bharat Kate RN  Outcome: Progressing  2/5/2025 2353 by Livia Grover RN  Outcome: Progressing     Problem: Skin/Tissue Integrity  Goal: Skin integrity remains intact  Description: 1.  Monitor for areas of redness and/or skin breakdown  2.  Assess vascular access sites hourly  3.  Every 4-6 hours minimum:  Change oxygen saturation probe site  4.  Every 4-6 hours:  If on nasal continuous positive airway pressure, respiratory therapy assess nares and determine need for appliance change or resting period  2/6/2025 0954 by Bharat Kate RN  Outcome: Progressing  2/5/2025 2353 by Livia Grover RN  Outcome: Progressing

## 2025-02-06 NOTE — PROGRESS NOTES
02/05/25 0252   NIV Type   $NIV $Daily Charge   NIV Started/Stopped On   Equipment Type v60   Mode CPAP   Mask Type Nasal mask   Mask Size Large   Settings/Measurements   PIP Observed 11 cm H20   CPAP/EPAP 10 cmH2O   Vt (Measured) 860 mL   FiO2  21 %   Minute Volume (L/min) 9.8 Liters   Mask Leak (lpm) 33 lpm   Patient's Home Machine No   Alarm Settings   Alarms On Y   Low Pressure (cmH2O) 6 cmH2O   High Pressure (cmH2O) 30 cmH2O   Delay Alarm 20 sec(s)   RR Low (bpm) 6   RR High (bpm) 40 br/min       
  [facility] Medicine Progress Note  V 1.6      Date of Admission: 2/4/2025    Hospital Day: 2      Chief Admission Complaint:  Alcohol use    Subjective:  Patient seen at bedside this morning patient no acute concerns. No nausea, vomiting, fevers, chills, pain anywhere, diaphoresis. CIWA: 0.    Presenting Admission History:       Rod Herring is a 54 y.o. male who presents with concerns for alcohol intoxication drinks 3 packs a day for the last 4 days has been doing worse denies any chest pain orthopnea PND does have palpitations tremors fatigue feels like he has about to go into withdrawals.  Patient had withdrawal issues before but he reports that he is getting better from alcohol standpoint but got worse today again.  To be admitted for alcohol intoxication impending withdrawals     Assessment/Plan:      Current Principal Problem:  Alcohol abuse    Alcohol intoxication impending withdrawals.  Drinks 3 packs a day for the last 4 days.  Has been drinking significantly for the last 1 month.  Feels guilty about it and is intending to quit drinking.  Does have realization that the patient is \"alcoholic \"as per the patient.  Will continue the patient on CIWA protocol with phenobarb thiamine, MVI, Folic acid with fluid hydration monitor for hypoglycemia monitor electrolytes replete as needed.  Currently on aspiration and seizure precautions.  Admitted to stepdown unit initially but concerns for seizures and pending bron with aggressive signs of withdrawals.  Started on Precedex drip and transferred to ICU at this time.  Denied any fall or trauma to the head  Transaminitis in the setting of above monitor LFTs on a regular basis  Mood disorder on fluoxetine at home    Ongoing threat to life and/or bodily function without ongoing treatment due to:  Alcohol use, withdrawal     Consults:      IP CONSULT TO HOSPITALIST  IP CONSULT TO SOCIAL WORK        --------------------------------------------------      Medications:    
  [facility] Medicine Progress Note  V 1.6      Date of Admission: 2/4/2025    Hospital Day: 3      Chief Admission Complaint:  Alcohol use    Subjective:  Patient seen at bedside this morning patient no acute concerns. No nausea, vomiting, fevers, chills, pain anywhere. Positive for diaphoresis overnight. CIWA: 3. Precedex drip used overnight as needed.    Presenting Admission History:       Rod Herring is a 54 y.o. male who presents with concerns for alcohol intoxication drinks 3 packs a day for the last 4 days has been doing worse denies any chest pain orthopnea PND does have palpitations tremors fatigue feels like he has about to go into withdrawals.  Patient had withdrawal issues before but he reports that he is getting better from alcohol standpoint but got worse today again.  To be admitted for alcohol intoxication impending withdrawals     Assessment/Plan:      Current Principal Problem:  Alcohol abuse    Alcohol intoxication impending withdrawals.  Drinks 3 packs a day for the last 4 days.  Has been drinking significantly for the last 1 month.  Feels guilty about it and is intending to quit drinking.  Does have realization that the patient is \"alcoholic \"as per the patient.  Will continue the patient on CIWA protocol with phenobarb thiamine, MVI, Folic acid with fluid hydration monitor for hypoglycemia monitor electrolytes replete as needed.  Currently on aspiration and seizure precautions.  Admitted to stepdown unit initially but concerns for seizures and pending [facility] with aggressive signs of withdrawals.  Started on Precedex drip and transferred to ICU at this time.  Denied any fall or trauma to the head. Today is day 2 after last drink, CIWA: 3.  Transaminitis in the setting of above monitor LFTs on a regular basis  Mood disorder on fluoxetine at home    Ongoing threat to life and/or bodily function without ongoing treatment due to:  Alcohol use, withdrawal     Consults:      IP CONSULT TO 
4 Eyes Skin Assessment     NAME:  Rod Herring  YOB: 1970  MEDICAL RECORD NUMBER:  [id number]    The patient is being assessed for  Admission    I agree that at least one RN has performed a thorough Head to Toe Skin Assessment on the patient. ALL assessment sites listed below have been assessed.      Areas assessed by both nurses:    Head, Face, Ears, Shoulders, Back, Chest, Arms, Elbows, Hands, Sacrum. Buttock, Coccyx, Ischium, Legs. Feet and Heels, and Under Medical Devices         Does the Patient have a Wound? No noted wound(s)       Oscra Prevention initiated by RN: Yes  Wound Care Orders initiated by RN: No    Pressure Injury (Stage 3,4, Unstageable, DTI, NWPT, and Complex wounds) if present, place Wound referral order by RN under : No    New Ostomies, if present place, Ostomy referral order under : No     Nurse 1 eSignature: Electronically signed by Do Arora RN on 2/5/25 at 1:33 AM EST    **SHARE this note so that the co-signing nurse can place an eSignature**    Nurse 2 eSignature: Electronically signed by Radha Montilla RN on 2/5/25 at 2:28 AM EST   
Admit Pt from C4 for Alcohol Detox on possible withdrawal. A/O x4 on room air. Wheeled to ICU room and ushered to bed. Hooked on cardiac monitor and initial VS taken. Bathed with CHG wipes. Oriented with the room and call light instructed with.       4 Eyes Skin Assessment     NAME:  Rod Herring  YOB: 1970  MEDICAL RECORD NUMBER:  [id number]    The patient is being assessed for  Transfer to New Unit    I agree that at least one RN has performed a thorough Head to Toe Skin Assessment on the patient. ALL assessment sites listed below have been assessed.      Areas assessed by both nurses:    Head, Face, Ears, Shoulders, Back, Chest, Arms, Elbows, Hands, Sacrum. Buttock, Coccyx, Ischium, Legs. Feet and Heels, and Under Medical Devices         Does the Patient have a Wound? No noted wound(s)       Oscar Prevention initiated by RN: No  Wound Care Orders initiated by RN: No    Pressure Injury (Stage 3,4, Unstageable, DTI, NWPT, and Complex wounds) if present, place Wound referral order by RN under : No    New Ostomies, if present place, Ostomy referral order under : No     Nurse 1 eSignature: Electronically signed by SARA RIBERA RN on 2/5/25 at 5:29 AM EST    **SHARE this note so that the co-signing nurse can place an eSignature**    Nurse 2 eSignature: Electronically signed by Salena Miles RN on 2/5/25 at 5:44 AM EST   
Patient admitted to room 449 from ED.  Patient oriented to room, call light, bed rails, phone, lights and bathroom.  Patient instructed about the schedule of the day including: vital sign frequency, lab draws, possible tests, frequency of MD and staff rounds, including RN/MD rounding together at bedside, daily weights, and I &O's.  Patient instructed about prescribed diet, how to use 8MENU, and television.  Bed alarm in place, patient aware of placement and reason.  Telemetry box in place, patient aware of placement and reason.  Bed locked, in lowest position, side rails up 2/4, call light within reach.    
Patient called out wanting to talk to nurse about leaving AMA. Admitting MD, Dr. Jeronimo notified and at bedside to speak to patient.    Dr. Jeronimo wanting patient to be moved to ICU for precedex at this time. Patient agreeable to plan of care.    
Pt decided to leave AMA. Said that he \"couldn't do it anymore\". Belongings received from security, pt signed. Medications retrieved from pharmacy and pt checked and verified. Pt walked to front door. Dr. Webster notified  
Shift: [id number]    Admitting diagnosis: ETOH Withdrawal    Presentation to hospital: N&V, For Alcohol Detox.     Surgery: no     Nursing assessment at handoff  stable    Emergency Contact/POA: Parent  Family updated: yes -     Most recent vitals: /75   Pulse 76   Temp 97.8 °F (36.6 °C) (Oral)   Resp 16   Ht 1.778 m (5' 10\")   Wt 105.2 kg (232 lb)   SpO2 92%   BMI 33.29 kg/m²      Rhythm: Normal Sinus Rhythm      NC/HFNC- NA lpm  Respiratory support: - No ventilator support    Vent days: Day NA    Increased O2 requirements: no    Admission weight Weight - Scale: 107 kg (236 lb)  Today's weight   Wt Readings from Last 1 Encounters:   02/05/25 105.2 kg (232 lb)         UOP >30ml/hr: yes -      May need assessed each shift: no    Restraints: no  Order current and documentation up to date?    Lines/Drains  LDA Insertion Date Discontinued Date Dressing Changes   PIV  2/5/25     TLC       Arterial       May       Vas Cath      ETT       Surgical drains        Night Shift Hospitalist Interventions    Problem(Brief) Date Time Intervention Physician contacted                                               Drip rates at handoff:    sodium chloride      dextrose 5% in lactated ringers 100 mL/hr at 02/05/25 0500    dexmedeTOMIDine 0.2 mcg/kg/hr (02/05/25 0610)       Hospital Course Daily Updates:  Admit Day# 0 (2/5/25) NIGHT  - Admission from   - UOP was 300mls  - Voids, Ambulated independently  - On D5LR and started on Precedex drip      Lab Data:   CBC:   Recent Labs     02/04/25 2306 02/05/25  0435   WBC 9.6 8.5   HGB 14.5 14.1   HCT 42.5 41.3   MCV 83.1 83.5    167     BMP:    Recent Labs     02/04/25 2306 02/05/25  0435    138   K 3.8 3.8   CO2 24 23   BUN 11 12   CREATININE 0.7* 0.8*     LIVR:   Recent Labs     02/04/25 2306   *   *     PT/INR: No results for input(s): \"INR\" in the last 72 hours.    Invalid input(s): \"PROT\"  APTT: No results for input(s): \"APTT\" in the last 
Training: Yes  Overall Level of Assistance: Supervision  Sit to Stand: Supervision  Stand to Sit: Supervision  Toilet Transfer: Supervision  Gait  Gait Training: Yes  Overall Level of Assistance: Contact guard assistance;Supervision  Distance (ft): 250 Feet  Assistive Device: Gait belt;None  Gait Abnormalities:  (Gait grossly steady with no LOB, near baseline per pt)  Rail Use: Left  Stairs - Level of Assistance: Stand by assistance  Number of Stairs Trained: 12     AROM: Within functional limits  Strength: Within functional limits  Coordination: Within functional limits  Tone: Normal  Sensation: Intact  ADL  Feeding: Independent  Feeding Skilled Clinical Factors: water  Grooming: Supervision  Grooming Skilled Clinical Factors: hand washing standing at sink  Toileting: Supervision  Toileting Skilled Clinical Factors: bladder void seated on toilet  Functional Mobility: Contact guard assistance;Supervision  Functional Mobility Skilled Clinical Factors: completed extended hallway mobility with CGA initally d/t balance but progressed to SUPV with repetition/time for IV line mgmt     Activity Tolerance  Activity Tolerance: Patient tolerated evaluation without incident;Patient tolerated treatment well        Vision  Vision: Impaired  Vision Exceptions: Wears glasses at all times  Hearing  Hearing: Within functional limits  Cognition  Overall Cognitive Status: WNL  Orientation  Overall Orientation Status: Within Normal Limits  Orientation Level: Oriented X4                  Education Given To: Patient  Education Provided: Role of Therapy;Plan of Care;Transfer Training;Energy Conservation;Mobility Training;Fall Prevention Strategies;ADL Adaptive Strategies  Education Method: Demonstration;Verbal  Barriers to Learning: None  Education Outcome: Verbalized understanding;Demonstrated understanding  Disease Specific Education: Pt educated on importance of OOB mobility, prevention of complications of bedrest, and general safety 
Goal 3: pt will ambulate 100ft with supervision  Short Term Goal 4: pt will navigate up<>down 12 steps with SBA  Additional Goals?: No  Patient Goals   Patient Goals : \"to go home\"       Education  Patient Education  Education Given To: Patient  Education Provided: Role of Therapy;Plan of Care;Transfer Training;Mobility Training  Education Provided Comments: Disease Specific Education: Pt educated on importance of OOB mobility, prevention of complications of bedrest, and general safety during hospitalization.  Education Method: Verbal  Barriers to Learning: None  Education Outcome: Verbalized understanding;Demonstrated understanding      Therapy Time   Individual Concurrent Group Co-treatment   Time In       1043   Time Out       1119   Minutes       36   Timed Code Treatment Minutes: 26 Minutes (10 min eval)   Seen as co-eval with OT in order to safely assess highest level of function and to optimize functional outcomes.       Linette Neil, PT, DPT

## 2025-02-06 NOTE — PLAN OF CARE
Problem: Discharge Planning  Goal: Discharge to home or other facility with appropriate resources  Outcome: Progressing     Problem: Safety - Adult  Goal: Free from fall injury  Outcome: Progressing     Problem: ABCDS Injury Assessment  Goal: Absence of physical injury  Outcome: Progressing     Problem: Pain  Goal: Verbalizes/displays adequate comfort level or baseline comfort level  Outcome: Progressing     Problem: Skin/Tissue Integrity  Goal: Skin integrity remains intact  Outcome: Progressing      CT scan from 4/2/2022 demonstrated 3 renal calculi in the renal pelvis measuring as big as 11 mm  Underwent ureteroscopy with Dr. Gavin on 5/17/2024 during procedure was found that she may have possible UPJ obstruction, she did have severe right sided hydronephrosis and multiple yellow/brown stones seen in the renal pelvis  CT stone study from 7/22/2024 demonstrated resolution of previous right renal stone burden and few small stones in the lower pole, continues with mild right hydronephrosis  Discussed her small renal calculi that are still present and do not require any intervention at this time  Discussed increasing water and fluid intake to at least 2 L of water daily  Continue with diet lifestyle modifications  Follow-up in 6 months with kidney and bladder ultrasound

## 2025-02-06 NOTE — CARE COORDINATION
Chart reviewed, PT recommending Home with assist PRN.  No discharge needs noted, pt has [facility] resources at bedside as provided by previous CM.  Please notify CM if needs arise.  YUNIOR Freire-RN

## 2025-05-30 PROBLEM — E87.20 LACTIC ACIDOSIS: Status: ACTIVE | Noted: 2025-01-01

## 2025-05-30 PROBLEM — K92.2 ACUTE GI BLEEDING: Status: ACTIVE | Noted: 2025-05-30

## 2025-05-30 PROBLEM — F10.921 ALCOHOL INTOXICATION WITH DELIRIUM: Status: ACTIVE | Noted: 2025-01-01

## 2025-05-30 PROBLEM — I95.9 HYPOTENSION: Status: ACTIVE | Noted: 2025-01-01

## 2025-05-30 PROBLEM — F10.930 ALCOHOL WITHDRAWAL SYNDROME WITHOUT COMPLICATION (HCC): Status: ACTIVE | Noted: 2025-01-01

## 2025-05-30 PROBLEM — K92.2 GI BLEED: Status: ACTIVE | Noted: 2025-05-30

## 2025-05-30 PROBLEM — K70.11 ALCOHOLIC HEPATITIS WITH ASCITES (HCC): Status: ACTIVE | Noted: 2025-05-30

## 2025-05-30 NOTE — PROGRESS NOTES
[facility] Vascular Access Team   Procedure Note.     Rod Herring   Admitted- 5/30/2025 10:02 AM  Admission diagnosis- Alcohol withdrawal syndrome without complication (HCC) [F10.930]      Attending Physician- Ray, Jennifer PLEITEZ MD  Ordering Physician- Ray  Indication for Insertion: Vasopressors     Order received for PICC insertion.  Upon entering room pt was sitting upright in bed pale, gray and diaphoretic. He was actively vomiting dark red blood. Pt's BP was 83/56 and HRT was 116. Pt appeared to be in distress. Informed ER physician Dr. Bell that pt would benefit from emergent line placed by a physician in his deteriorating state. Dr. Bell to bedside to place central line.    Vascular Access Consult cancelled at this time.    Saida Caban RN  Vascular Access Team

## 2025-05-30 NOTE — CONSENT
Informed Consent for Blood Component Transfusion Note    I have discussed with the patient the rationale for blood component transfusion; its benefits in treating or preventing fatigue, organ damage, or death; and its risk which includes mild transfusion reactions, rare risk of blood borne infection, or more serious but rare reactions. I have discussed the alternatives to transfusion, including the risk and consequences of not receiving transfusion. The patient had an opportunity to ask questions and had agreed to proceed with transfusion of blood components.    Electronically signed by Caitlin To PA-C on 5/30/25 at 4:56 PM EDT

## 2025-05-30 NOTE — PROGRESS NOTES
100 mls of bright red bloody emesis at this time.  Pt sweating and looking pale, states that he feels much weaker.     Call out to Dr. Michaels and Dr. Lewis.

## 2025-05-30 NOTE — PROGRESS NOTES
Spoke with patient about current medications was able to verify most recent refills at [facility].

## 2025-05-30 NOTE — ED NOTES
Rod Herring is a 55 y.o. male admitted for  Active Problems:    * No active hospital problems. *  Resolved Problems:    * No resolved hospital problems. *  .   Patient Home via EMS transportation with   Chief Complaint   Patient presents with    Chest Pain    Shortness of Breath     +diaphoretic, dyspnea at rest, also detoxying from drinking, drinks 30 beers  day, had 6, large beers this am    Alcohol Problem   .  Patient is alert and Person, Place, Time, and Situation  Patient's baseline mobility: Baseline Mobility: Independent   Code Status: Prior   Cardiac Rhythm: Cardiac Rhythm: Sinus tachy     Is patient on baseline Oxygen: no how many Liters   :   Abnormal Assessment Findings: Pt reports that he drank 6, large 16 ounce beers this am prior to arrival, however ETOH level is low.    Isolation: None      NIH Score:    C-SSRS: Risk of Suicide: No Risk  Bedside swallow:        Active LDA's:   Peripheral IV 05/30/25 Right Antecubital (Active)   Site Assessment Clean, dry & intact 05/30/25 1032   Line Status Blood return noted;Flushed;Specimen collected;Brisk blood return 05/30/25 1032   Phlebitis Assessment No symptoms 05/30/25 1032   Infiltration Assessment 0 05/30/25 1032   Dressing Status New dressing applied 05/30/25 1032   Dressing Type Transparent 05/30/25 1032   Dressing Intervention New 05/30/25 1032       Peripheral IV 05/30/25 Left;Anterior Hand (Active)   Site Assessment Clean, dry & intact 05/30/25 1111   Line Status Blood return noted 05/30/25 1111   Phlebitis Assessment No symptoms 05/30/25 1111   Infiltration Assessment 0 05/30/25 1111     Patient admitted with a artis: no If the artis is chronic was it exchanged:NA  Reason for artis:   Patient admitted with Central Line:  NA . PICC line placement confirmed: YES OR NO:[id number]}   Reason for Central line:   Was central line Inserted from an outside facility:        Family/Caregiver Present no Any Concerns: no   Restraints no  Sitter no         Vitals:  MEWS Score: 4    Vitals:    05/30/25 1132 05/30/25 1202 05/30/25 1217 05/30/25 1220   BP: 107/65 109/61     Pulse: 96 (!) 101 (!) 108 78   Resp: 19 24     Temp:       TempSrc:       SpO2: 96% 96%     Weight:       Height:           Last documented pain score (0-10 scale)    Pain medication administered No.    Pertinent or High Risk Medications/Drips: No.    Pending Blood Product Administration: no    Abnormal labs:   Abnormal Labs Reviewed   CBC WITH AUTO DIFFERENTIAL - Abnormal; Notable for the following components:       Result Value    Hemoglobin 11.9 (*)     Hematocrit 34.9 (*)     RDW 16.4 (*)     All other components within normal limits   COMPREHENSIVE METABOLIC PANEL W/ REFLEX TO MG FOR LOW K - Abnormal; Notable for the following components:    Sodium 132 (*)     Chloride 92 (*)     Anion Gap 19 (*)     Glucose 161 (*)     Calcium 8.0 (*)     Total Protein 5.8 (*)     Albumin 3.3 (*)     Total Bilirubin 3.9 (*)     Alkaline Phosphatase 236 (*)      (*)      (*)     All other components within normal limits   BLOOD GAS, VENOUS - Abnormal; Notable for the following components:    pH, Mikel 7.540 (*)     pCO2, Mikel 26.5 (*)     PO2, Mikel 61.3 (*)     HCO3, Venous 22.2 (*)     All other components within normal limits   TROPONIN - Abnormal; Notable for the following components:    Troponin, High Sensitivity 26 (*)     All other components within normal limits   TROPONIN - Abnormal; Notable for the following components:    Troponin, High Sensitivity 33 (*)     All other components within normal limits     Critical values: Elevated Trop  Intervention for critical value(s):     Abnormal Imaging: no             You may also review the ED PT Care Timeline found under the Summary Tab, ED Encounter Summary, Timeline Reports, ED Patient Care Timeline.     Recommendation    Pending orders/Uncompleted orders to hand off:  Lactic and Repeat Trop at 1330    Additional Comments: Pt drinks 30 beers daily, initial  CIWA was 12 with repeat of 5. Pt took valium PTA today and did not discuss with Provider.  If any further questions, please call Sending RN at ED [phone]

## 2025-05-30 NOTE — ED NOTES
4:40 Call placed to [facility] for consult    [id number] - Provider called back and spoke with Caitlin To PA-C to complete the consult

## 2025-05-30 NOTE — PROCEDURES
ENDOTRACHEAL INTUBATION    INDICATION: Life threatening GIB for airway protection    CONSENT: from patient     TIME OUT: Taken    SEDATION: Fentanyl, Versed, Etomidate, ketamine, rocuronium    PROCEDURE: Using video-assisted laryngoscopy, the vocal cords were well visualized, and a 7.5 mm cuffed endotracheal tube was placed directly through the cords.  Number of attempts #1. good breath sounds were auscultated bilaterally, with no sounds detected over the abdomen. There was a good return of ETCO2 on the monitor. The patient tolerated the procedure well, with no immediate complications. A chest X-ray is pending to confirm satisfactory tube placement

## 2025-05-30 NOTE — ED NOTES
Pt becoming more diaphoretic and clammy. Dr. Bell aware and is at the bedside. Pt reports that he had vomited a large amount of blood mixed with vomit today.

## 2025-05-30 NOTE — PROGRESS NOTES
05/30/25 1937   Patient Observation   Pulse (!) 145   Respirations 16   SpO2 100 %   Vent Information   Ventilator Initiate Yes   Vent Mode AC/VC   $Ventilation $Initial Day   Ventilator Settings   Vt (Set, mL) 440 mL   Resp Rate (Set) 16 bpm   PEEP/CPAP (cmH2O) 5   FiO2  50 %   Vent Patient Data (Readings)   Vt (Measured) 410 mL   Peak Inspiratory Pressure (cmH2O) 18 cmH2O   Rate Measured 16 br/min   Minute Volume (L/min) 6.55 Liters   Peak Inspiratory Flow (lpm) 60 L/sec   Mean Airway Pressure (cmH2O) 8.1 cmH20   I:E Ratio 1:3.7   Backup Apnea On   Backup Rate 16 Breaths Per Minute   Backup Vt 440   Vent Alarm Settings   High Pressure (cmH2O) 40 cmH2O   Low Minute Volume (lpm) 4 L/min   High Minute Volume (lpm) 26 L/min   Low Exhaled Vt (ml) 250 mL   High Exhaled Vt (ml) 1100 mL   RR High (bpm) 40 br/min   Apnea (secs) 20 secs   Additional Respiratoray Assessments   Humidification Source Heated wire   Ambu Bag With Mask At Bedside Yes   Ventilator Associated Pneumonia Bundle   Elevation of Head of Bed to 30-45 Degrees  Yes   Oral Care Completed No   Oral Suctioning No   ETT/Trach Suctioning Yes

## 2025-05-30 NOTE — CONSULTS
Gastroenterology Consult Note    Patient:   Rod Herring   :    1970   Facility:     [facility] EMERGENCY DEPARTMENT  [address]  [facility] [address]   Referring/PCP: Alexei Aguirre MD  Date:     2025  Consultant:   Karina Lewis MD    Subjective:     55-year-old male with history of alcohol abuse, cirrhosis presents with weakness and has an episode of dark emesis in the ER in which GI is consulted.    The patient was previously seen in our service with last EGD being in .  He drinks alcohol heavily with approximately 30 beers every day.  He reports chest pain and epigastric abdominal pain that started this morning as well as general feeling of weakness.  He reports an episode of dark liquid emesis at home and he had another episode of a moderate amount of dark liquid emesis while in the ER.  Hemoglobin on presentation was 11.9 and trended down to 7.5 after the episode of emesis and IV hydration.  He also had lactic acidosis and elevated liver enzymes with a bilirubin of 3.9.  CT of the abdomen demonstrated ectasia of the ascending aorta as well as enlarged pulmonary arteries.  Vascular surgery consultation has been obtained and is pending.  Also demonstrated cirrhosis with possible upper abdominal varices.  No ascites is reported.  The patient has been started on transfusion of 1 unit of blood and IV fluids.  It appears that he was given a dose of aspirin 325 earlier per the chart.  He is hemodynamically stable at the time of my assessment.    Past Medical History:   Diagnosis Date    Bronchitis     Depression     ETOH abuse     Prostate enlargement      Past Surgical History:   Procedure Laterality Date    LEG SURGERY      hamstring left    TONSILLECTOMY         Social:   Social History     Tobacco Use    Smoking status: Every Day     Current packs/day: 2.00     Average packs/day: 2.0 packs/day for 15.0 years (30.0 ttl pk-yrs)     Types:  reconstruction, and/or weight based adjustment of the mA/kV was utilized to reduce the radiation dose to as low as reasonably achievable.; CT of the abdomen and pelvis was performed with the administration of intravenous contrast. Multiplanar reformatted images are provided for review. Automated exposure control, iterative reconstruction, and/or weight based adjustment of the mA/kV was utilized to reduce the radiation dose to as low as reasonably achievable. COMPARISON: CT of the abdomen and pelvis 07/02/2006. HISTORY: ORDERING SYSTEM PROVIDED HISTORY: lightheadedness, SOB, meeting sepsis criteria TECHNOLOGIST PROVIDED HISTORY: Reason for exam:->lightheadedness, SOB, meeting sepsis criteria Additional Contrast?->1 Reason for Exam: lightheadedness, SOB, meeting sepsis criteria; ORDERING SYSTEM PROVIDED HISTORY: meeting sepsis criteria, elevated lactic, transamitinitis TECHNOLOGIST PROVIDED HISTORY: Reason for exam:->meeting sepsis criteria, elevated lactic, transamitinitis Additional Contrast?->None Decision Support Exception - unselect if not a suspected or confirmed emergency medical condition->Emergency Medical Condition (MA) Reason for Exam: meeting sepsis criteria, elevated lactic, transamitinitis FINDINGS: CTA CHEST: Pulmonary Arteries: Pulmonary arteries are adequately opacified for evaluation.  No evidence of intraluminal filling defect to suggest pulmonary embolism.  Mildly enlarged main pulmonary artery at 3.5 cm. Mediastinum: Ectasia of the ascending aorta measuring maximally 4.1 cm.  The remainder of the thoracic aorta is normal in caliber.  The heart is not enlarged with no pericardial effusion.  The esophagus is unremarkable.  No pathologic hilar or mediastinal adenopathy. Lungs/pleura: The lungs are clear.  No infiltrate, consolidation or edema. No pleural fluid or pneumothorax.  The central airways are patent. Soft Tissues/Bones: No acute bone or soft tissue abnormality. CT ABDOMEN AND PELVIS Organs:  not a suspected or confirmed emergency medical condition->Emergency Medical Condition (MA) Reason for Exam: dizziness FINDINGS: BRAIN/VENTRICLES: There is no acute intracranial hemorrhage, mass effect or midline shift.  No abnormal extra-axial fluid collection.  The gray-white differentiation is maintained without evidence of an acute infarct.  There is no evidence of hydrocephalus. ORBITS: The visualized portion of the orbits demonstrate no acute abnormality. SINUSES: The visualized paranasal sinuses and mastoid air cells demonstrate no acute abnormality. SOFT TISSUES/SKULL:  No acute abnormality of the visualized skull or soft tissues.     No acute intracranial abnormality.     XR CHEST PORTABLE  Result Date: 5/30/2025  EXAMINATION: ONE XRAY VIEW OF THE CHEST 5/30/2025 10:30 am COMPARISON: 07/31/2016 HISTORY: ORDERING SYSTEM PROVIDED HISTORY: sob TECHNOLOGIST PROVIDED HISTORY: Reason for exam:->sob Reason for Exam: sob/chest pain FINDINGS: The lungs are without acute focal process.  There is no effusion or pneumothorax. The cardiomediastinal silhouette is stable. The osseous structures are stable.     No acute process.        Hospital Problems           Last Modified POA    * (Principal) Alcohol withdrawal syndrome without complication (HCC) 5/30/2025 Yes    GI bleed 5/30/2025 Yes    Hypotension 5/30/2025 Yes    Alcohol intoxication with delirium 5/30/2025 Yes    Alcoholic hepatitis with ascites (HCC) 5/30/2025 Yes    Lactic acidosis 5/30/2025 Yes    Acute GI bleeding 5/30/2025 Yes     Assessment:   55-year-old male with history of alcohol abuse, cirrhosis presents with weakness and has an episode of dark emesis in the ER in which GI is consulted.    Cirrhosis  Alcohol intoxication  Alcohol abuse  Dark emesis  Anemia  Lactic acidosis    Plan:   -Patient presents with dark emesis and acute anemia in the setting of cirrhosis, alcohol abuse with imaging suggestive of varices.  He is not actively vomiting at this

## 2025-05-30 NOTE — PROGRESS NOTES
Call to pts mother, Farheen, to make her aware of intubation and EGD.   She will be coming to the hospital soon.

## 2025-05-30 NOTE — CONSULTS
Pulmonary & Critical Care Consultation Note    Patient is being seen at the request of Jennifer Bell MD  for a consultation for GI bleed    HISTORY OF PRESENT ILLNESS:   55 years old presented with hematemesis x 2 times, one is at home. No history of GIB. + palpitation and  chest pain. No syncope. Drinks 30 beers daily, up until yesterday. Recently admitted February treated for withdrawal left AMA. Smoker. No drugs abuse.     PAST MEDICAL HISTORY:  Past Medical History:   Diagnosis Date    Bronchitis     Depression     ETOH abuse     Prostate enlargement      PAST SURGICAL HISTORY:  Past Surgical History:   Procedure Laterality Date    LEG SURGERY      hamstring left    TONSILLECTOMY         FAMILY HISTORY:  family history is not on file.    SOCIAL HISTORY:   reports that he has been smoking cigarettes. He has a 30 pack-year smoking history. He does not have any smokeless tobacco history on file.    Scheduled Meds:   vancomycin  2,500 mg IntraVENous Once    chlordiazePOXIDE  25 mg Oral TID    albumin human 25%  25 g IntraVENous Once    sodium chloride  500 mL IntraVENous Once    sodium chloride flush  5-40 mL IntraVENous 2 times per day    lidocaine 1 % injection  50 mg IntraDERmal Once    fentanNYL  25 mcg IntraVENous Once     Continuous Infusions:   sodium chloride       PRN Meds:  sodium chloride flush, sodium chloride, ondansetron    ALLERGIES:  Patient has no known allergies.    REVIEW OF SYSTEMS:  Constitutional: Negative for fever  HENT: Negative for sore throat  Eyes: Negative for redness   Respiratory:+ SOB  Cardiovascular: Negative for chest pain  Gastrointestinal: + hematemesis   Genitourinary: Negative for hematuria   Musculoskeletal: +arthralgias   Skin: Negative for rash  Neurological: Negative for syncope  Hematological: Negative for adenopathy  Psychiatric/Behavorial: Negative for anxiety    PHYSICAL EXAM:  Blood pressure (!) 100/56, pulse (!) 117, temperature 98.7 °F (37.1 °C), resp. rate 24,

## 2025-05-30 NOTE — PROGRESS NOTES
Order for rapid sequence intubation obtained.      Patient pre-oxygenated to with HFNC to a saturation 100 %.     1 mg of Versed ordered and administered at 7:23 PM    1 mg versed given 7:26 PM.    25 mcg fentanyl 7:25 PM.    10 mg of Etomidate ordered and administered at 7:27 PM     25 MCG of Fentanyl ordered and administered at 7:23 PM  110 mg Ketamine administered.  100 mg of nagi administered 7:28 PM       Recent Labs     05/30/25  1030   *   K 3.8   BUN 16   CREATININE 1.2   MG 1.91         Dr. Michaels inserted a number  7.5 ET tube. The ET tube is secured at 24 cm measured at the patients lip line. Breath sounds are equal bilaterally. There is a positive color change noted in the colorimetric CO2 detector. RT connected the patient to a ventilator. See orders for ventilator orders.     POST Intubation ORDERS      Portable Chest x-ray ordered to confirm placement of the patients ET tube.  Bilateral wrist restraints ordered and initiated. Pulses present distal to restraints.   Propofol and fentanyl  drip ordered for sedation. See EMAR and orders.     See physician note to follow. Electronically signed by Nae Morocho RN on 5/30/2025 at 7:21 PM

## 2025-05-30 NOTE — PLAN OF CARE
Alcohol detox  Acute GI bleed  Blood loss anemia, hypotension, varices on CT  GI consultd, blood ordered, central line placed  Admit to ICU

## 2025-05-30 NOTE — ED PROVIDER NOTES
[facility] EMERGENCY DEPARTMENT  EMERGENCY DEPARTMENT ENCOUNTER        Patient Name: Rod Herring  MRN: [id number]  Birthdate 1970  Date of evaluation: 5/30/2025  Provider: Jennifer Bell MD  PCP: Alexei Aguirre MD  Note Started: 12:27 PM EDT 5/30/25    I independently examined and evaluated Rod Herring. I personally saw the patient and performed a substantive portion of the visit including all aspects of the medical decision making.  I made/approved the management plan and take responsibility for the patient management.  I am the primary physician of record.    CHIEF COMPLAINT  Lightheadedness, shortness of breath, palpitations       HISTORY OF PRESENT ILLNESS  History from : Patient    Limitations to history : None    In brief, Rod Herring is a 55 y.o. male  has a past medical history of Bronchitis, Depression, ETOH abuse, and Prostate enlargement., who presents to the ED complaining of palpitations and short of breath.  Patient reports that he had feelings this morning like he was having a panic attack.  He reports palpitations.  He denies chest pain or abdominal pain, states that it is more of a fluttering in his chest rather than pain.  He did report shortness of breath and lightheadedness.  Patient reports that he typically drinks 30 beers per day, states he drank 6 this morning.  Patient also reports that he took 10 mg of Valium (patient did not initially disclose this).  Patient does report that the lightheadedness seem to mostly occur with position changes      REVIEW OF SYSTEMS  All systems reviewed, pertinent positives per HPI otherwise noted to be negative.    Focused exam revealed   PHYSICAL EXAM  ED Triage Vitals   BP Systolic BP Percentile Diastolic BP Percentile Temp Temp Source Pulse Respirations SpO2   05/30/25 1020 -- -- 05/30/25 1020 05/30/25 1020 05/30/25 1020 05/30/25 1020 05/30/25 1020   (!) 160/123   99.1 °F (37.3 °C) Oral (!) 118 24 98 %      Height Weight - Scale   Indeterminate     Confirmatory study:          What confirmatory study was done?:  CT         Confirmatory study findings::  CT scan showed no evidence of pericardial effusion, so suspect pericardial fat pad.  Do suspect that patient could tolerate more fluid  Electronically signed by Jennifer Bell on Friday, May 30, 2025 at 5:19 PM : Jennifer Bell Attending: Jennifer Bell         PROCEDURES     Procedure Note - Central Line:  The benefits, risks, and alternatives of central venous access were discussed with the  patient and Verbal consent was obtained for the procedure. Rod Herring was prepped and draped in standard bedside fashion in the right internal jugular area. Physical landmarks with ultrasound guidance was used to locate the central vein. Local anesthesia with 3ml of 1% lidocaine was injected. Seldinger technique was used for placement of the CVC in a non-pulsatile vasculature. All ports dulce and flushed. The patient tolerated the procedure well and no acute complications occurred.       EMERGENCY DEPARTMENT COURSE and DIFFERENTIAL DIAGNOSIS/MDM:   Patient seen and evaluated. Old records reviewed and pertinent information included in HPI. Labs and imaging reviewed and results discussed with patient.      Overall patient presenting for palpitations and lightheadedness.  History obtained from patient.  Physical exam remarkable for tachycardia.     Patient's pertinent external medical records: Records Reviewed : None    Chronic Medical Conditions that may contribute to presentation today:  has a past medical history of Bronchitis, Depression, ETOH abuse, and Prostate enlargement.     Social Determinants affecting Dx or Tx:    Social History     Socioeconomic History    Marital status: Single     Spouse name: Not on file    Number of children: Not on file    Years of education: Not on file    Highest education level: Not on file   Occupational History    Not on file   Tobacco Use    Smoking status: Every  after fluid bolus        administration      [] Vasopressors initiated (if hypotension persists after fluid resuscitation)        [] No criteria met for Septic Shock.   Patient Vitals for the past 6 hrs:   BP Temp Pulse Resp SpO2   05/30/25 1132 107/65 -- 96 19 96 %   05/30/25 1202 109/61 -- (!) 101 24 96 %   05/30/25 1217 -- -- (!) 108 -- --   05/30/25 1220 -- -- 78 -- --   05/30/25 1410 90/67 -- (!) 127 25 94 %   05/30/25 1425 90/67 -- (!) 128 -- --   05/30/25 1434 -- 97.8 °F (36.6 °C) -- -- --   05/30/25 1502 (!) 82/37 -- (!) 116 13 --   05/30/25 1503 (!) 84/50 -- (!) 122 26 --   05/30/25 1505 92/64 -- (!) 121 24 --   05/30/25 1543 (!) 79/59 -- -- -- --   05/30/25 1552 (!) 86/35 -- (!) 114 -- --   05/30/25 1616 (!) 89/60 -- -- -- --   05/30/25 1628 (!) 83/56 -- (!) 116 -- 100 %   05/30/25 1629 (!) 100/56 -- -- -- --   05/30/25 1629 (!) 100/56 98.7 °F (37.1 °C) (!) 117 -- 98 %   05/30/25 1648 (!) 106/58 -- (!) 109 -- 99 %   05/30/25 1652 (!) 104/58 -- (!) 105 -- 96 %   05/30/25 1702 (!) 92/58 -- (!) 113 -- 100 %   05/30/25 1711 (!) 100/56 -- (!) 108 -- 99 %      Recent Labs     05/30/25  1030 05/30/25  1259 05/30/25  1618   WBC 8.8  --   --    LACTA  --  2.7* 4.7*   CREATININE 1.2  --   --    BILITOT 3.9*  --   --    INR 1.13  --   --      --   --          Patient received fluids early on but delay in antibiotics given no infectious source was identified.  There is still not been an infectious source identified but we did proceed with antibiotics    Fluid Resuscitation Rational: at least 30mL/kg based on ideal body weight due to obesity defined as BMI >30 (patient's BMI is Body mass index is 35.87 kg/m². and IBW is Ideal body weight: 73 kg (160 lb 15 oz)Adjusted ideal body weight: 89.2 kg (196 lb 9 oz))      Repeat lactate level: worsening    Reassessment Exam:   Patient's blood pressure worsened during emergency department stay.  Did not respond to fluids.  Lactate worsened.  This may be related to GI    BP: 107/65 109/61     Pulse: 96 (!) 101 (!) 108 78   Resp: 19 24     Temp:       TempSrc:       SpO2: 96% 96%     Weight:       Height:           CRITICAL CARE TIME     I personally spent a total of 90 minutes of critical care time in obtaining history, performing a physical exam, bedside monitoring of interventions, collecting and interpreting tests and discussion with consultants but excluding time spent performing procedures, treating other patients and teaching time.                   FINAL IMPRESSION      1. Hypotension, unspecified hypotension type    2. Intermittent lightheadedness    3. Palpitations    4. Elevated troponin    5. Alcohol withdrawal syndrome without complication (HCC)    6. Benzodiazepine abuse (HCC)    7. Sepsis, due to unspecified organism, unspecified whether acute organ dysfunction present (HCC)    8. Upper GI bleed    9. Hematemesis with nausea          DISPOSITION/PLAN   Disposition: DISPOSITION Admitted 05/30/2025 05:00:42 PM   DISPOSITION CONDITION Unstable       DISCLAIMER: This chart was created using Dragon dictation software.  Efforts were made by me to ensure accuracy, however some errors may be present due to limitations of this technology and occasionally words are not transcribed correctly.    MD Ray Lantigua Erica J, MD  05/30/25 [id number]

## 2025-05-30 NOTE — H&P
Hospital Medicine History & Physical      Date of Admission: 5/30/2025    Date of Service:  Pt seen/examined on 5/30/2025    [x]Admitted to Inpatient with expected LOS greater than two midnights due to medical therapy.  []Placed in Observation status.    Chief Admission Complaint: Palpitations, shortness of breath as well as concern for GI bleed    Presenting Admission History:      55 y.o. male who presented to [facility] with acute GI bleed in the setting of alcohol abuse.  PMHx significant for alcohol abuse with history of withdrawal, depression, hypertension.      All of history obtained by chart review.  Unable to gather history from patient as he is intubated.     Noted that patient drinks 30 beers a day and has been doing this for many years.  He presented to the department with worsening palpitations shortness of breath and feels like he was having a panic attack.  He drinks 30 beers a day and drink Lasix this morning as well as taking a 10 mg dose of Valium and then subsequently developed lightheadedness.  In the ED, was noted that he vomited large amount of coffee-ground emesis and had also had an episode just prior to presentation but did not endorse this to ER team initially.  He quickly decompensated in the emergency department and required ICU admission.    In the ED, patient was noted to be tachycardic, hypotensive, tachypneic.  Was initially on room air but required intubation as well as pressor initiation.  BMP was obtained showed sodium 132, chloride 92, bicarb of 21 with anion gap of 19.  Initial lactic was 2.7 with send of 4.7.  Glucose was elevated.  Initial troponin was 26 then continues to uptrend.  , , .  UDS positive for benzodiazepines, otherwise unremarkable.  CBC initially showed WBC of 8.8 and hemoglobin of 11.9 on presentation.  6 hours after that, repeat H&H showed hemoglobin 7.5 and then 4 hours after that, hemoglobin resulted again down to 6.1.    Chest  LTD  Result Date: 5/30/2025  POCUS_Cardiac     Exam Information:          Exam type:  Clinically indicated     Indication(s) for Exam:          The exam was performed with the following indications::  Hypotension     Views Obtained & Images Saved for These Views:          The pericardial sac, myocardium, 4 chambers, and IVC were identified using the following views::          Subxiphoid     Findings:          Pericardial Effusion:  Indeterminate         Other pericardial findings::  Suspect pericardial fat pad rather than pericardial effusion.  No evidence of tamponade         Cardiac Activity:  Hyperdynamic         Other IVC findings::  Unable to visualize IVC.  Did look at the jugular vein and it showed collapsibility     Interpretation:          Indeterminate     Confirmatory study:          What confirmatory study was done?:  CT         Confirmatory study findings::  CT scan showed no evidence of pericardial effusion, so suspect pericardial fat pad.  Do suspect that patient could tolerate more fluid  Electronically signed by Jennifer Bell on Friday, May 30, 2025 at 5:19 PM : Jennifer Bell Attending: Jennifer Bell     CT CHEST PULMONARY EMBOLISM W CONTRAST  Result Date: 5/30/2025  EXAMINATION: CTA OF THE CHEST; CT OF THE ABDOMEN AND PELVIS WITH CONTRAST 5/30/2025 TECHNIQUE: CTA of the chest was performed after the administration of intravenous contrast.  Multiplanar reformatted images are provided for review.  MIP images are provided for review. Automated exposure control, iterative reconstruction, and/or weight based adjustment of the mA/kV was utilized to reduce the radiation dose to as low as reasonably achievable.; CT of the abdomen and pelvis was performed with the administration of intravenous contrast. Multiplanar reformatted images are provided for review. Automated exposure control, iterative reconstruction, and/or weight based adjustment of the mA/kV was utilized to reduce the radiation dose to as low

## 2025-05-30 NOTE — PROGRESS NOTES
Per the ICU the patient vomited 100cc of red blood and became hypotensive, pressors were started. Evaluated at the bedside. Will proceed with emergent EGD at bedside. Case discussed with Intensivist who will plan to intubate the patient for airway protection. Risks and benefits of the procedure discussed with the patient.

## 2025-05-30 NOTE — ED PROVIDER NOTES
[facility] EMERGENCY DEPARTMENT  EMERGENCY DEPARTMENT ENCOUNTER        Pt Name: Rod Herring  MRN: [id number]  Birthdate 1970  Date of evaluation: 5/30/2025  Provider: Caitlin To PA-C  PCP: Alexei Aguirre MD  Note Started: 11:56 AM EDT 5/30/25       I have seen and evaluated this patient with my supervising physician Jennifer Bell MD.      CHIEF COMPLAINT       Chief Complaint   Patient presents with    Chest Pain    Shortness of Breath     +diaphoretic, dyspnea at rest, also detoxying from drinking, drinks 30 beers  day, had 6, large beers this am    Alcohol Problem       HISTORY OF PRESENT ILLNESS: 1 or more Elements     History From: Patient and EMS            Chief Complaint: Shortness of breath    Rod Herring is a 55 y.o. male who presents he tells me that he has been drinking about 30 beers a day.  He states he was here about 4 months ago and went through alcohol withdrawal.  He has been experiencing more stress and started drinking again.  [birth year] his birthday.  He has had about 6 larger beers today that he states is equivalent to about 12 beers.  About 1 to 2 hours before arrival he was sitting down and he felt acutely short of breath and lightheaded and sweaty.  He denies having chest pain.  He states this is not pain but more feeling like his heart is going to come out of his chest and is beating fast.  He feels nauseous.  He denies any vomiting diarrhea abdominal pain fever cough.  He denies any drug use.  No history of cardiac issues    Nursing Notes were all reviewed and agreed with or any disagreements were addressed in the HPI.    REVIEW OF SYSTEMS :      Review of Systems    Positives and Pertinent negatives as per HPI.     SURGICAL HISTORY     Past Surgical History:   Procedure Laterality Date    LEG SURGERY      hamstring left    TONSILLECTOMY         CURRENTMEDICATIONS       Previous Medications    BUPROPION (WELLBUTRIN) 75 MG TABLET        DIAZEPAM (VALIUM) 5 MG    (!) 160/123   99.1 °F (37.3 °C) Oral (!) 118 24 98 %      Height Weight - Scale         05/30/25 1039 05/30/25 1039         1.778 m (5' 10\") 113.4 kg (250 lb)             Physical Exam  Constitutional:       General: He is not in acute distress.     Appearance: Normal appearance. He is not ill-appearing.      Comments: Anxious, disheveled male mildly diaphoretic   HENT:      Head: Normocephalic and atraumatic.      Mouth/Throat:      Mouth: Mucous membranes are moist.      Pharynx: Oropharynx is clear.   Eyes:      General: No scleral icterus.     Extraocular Movements: Extraocular movements intact.      Conjunctiva/sclera: Conjunctivae normal.      Pupils: Pupils are equal, round, and reactive to light.   Cardiovascular:      Rate and Rhythm: Regular rhythm. Tachycardia present.      Heart sounds: Normal heart sounds.   Pulmonary:      Effort: No respiratory distress.      Breath sounds: Normal breath sounds.      Comments: Increased work of breathing  Abdominal:      Tenderness: There is no abdominal tenderness. There is no right CVA tenderness, left CVA tenderness or guarding.   Musculoskeletal:         General: No tenderness or signs of injury.      Cervical back: Normal range of motion and neck supple.      Right lower leg: No edema.      Left lower leg: No edema.   Skin:     General: Skin is warm and dry.      Findings: No rash.   Neurological:      General: No focal deficit present.      Mental Status: He is alert and oriented to person, place, and time. Mental status is at baseline.      Cranial Nerves: No cranial nerve deficit.      Sensory: No sensory deficit.      Motor: No weakness.      Gait: Gait normal.           DIAGNOSTIC RESULTS   LABS:    Labs Reviewed   CBC WITH AUTO DIFFERENTIAL - Abnormal; Notable for the following components:       Result Value    Hemoglobin 11.9 (*)     Hematocrit 34.9 (*)     RDW 16.4 (*)     All other components within normal limits   COMPREHENSIVE METABOLIC PANEL W/  review of laboratory data, radiology results, discussion with consultants, and monitoring for potential decompensation. Interventions were performed as documented above.  See interventions in ED course.     EMERGENCY DEPARTMENT COURSE and DIFFERENTIAL DIAGNOSIS/MDM:   Vitals:    Vitals:    05/30/25 1629 05/30/25 1629 05/30/25 1648 05/30/25 1652   BP: (!) 100/56 (!) 100/56 (!) 106/58 (!) 104/58   Pulse:  (!) 117 (!) 109 (!) 105   Resp:       Temp:  98.7 °F (37.1 °C)     TempSrc:       SpO2:  98% 99% 96%   Weight:       Height:           [unfilled]    Patient was given the following medications:  Medications   vancomycin (VANCOCIN) 2,500 mg in sodium chloride 0.9 % 500 mL IVPB (2,500 mg IntraVENous New Bag 5/30/25 1609)   chlordiazePOXIDE (LIBRIUM) capsule 25 mg (25 mg Oral Given 5/30/25 1527)   albumin human 25% IV solution 25 g (25 g IntraVENous New Bag 5/30/25 1626)   sodium chloride 0.9 % bolus 500 mL (500 mLs IntraVENous New Bag 5/30/25 1609)   sodium chloride flush 0.9 % injection 5-40 mL (has no administration in time range)   sodium chloride flush 0.9 % injection 5-40 mL (has no administration in time range)   0.9 % sodium chloride infusion (has no administration in time range)   lidocaine 1 % injection 50 mg (has no administration in time range)   ondansetron (ZOFRAN) injection 4 mg (4 mg IntraVENous Given 5/30/25 1648)   fentaNYL (SUBLIMAZE) injection 25 mcg (has no administration in time range)   pantoprazole (PROTONIX) injection 40 mg (has no administration in time range)   octreotide (SANDOSTATIN) 500 mcg in sodium chloride 0.9 % 100 mL infusion (has no administration in time range)   octreotide (SANDOSTATIN) injection 50 mcg (has no administration in time range)   pantoprazole (PROTONIX) 80 mg in sodium chloride 0.9 % 100 mL infusion (has no administration in time range)   0.9 % sodium chloride infusion (has no administration in time range)   LORazepam (ATIVAN) injection 1 mg (1 mg IntraVENous Given  5/30/25 1038)   sodium chloride 0.9 % bolus 1,000 mL (0 mLs IntraVENous Stopped 5/30/25 1157)   ondansetron (ZOFRAN) injection 4 mg (4 mg IntraVENous Given 5/30/25 1202)   sodium chloride 0.9 % bolus 1,000 mL (0 mLs IntraVENous Stopped 5/30/25 1610)   aspirin tablet 325 mg (325 mg Oral Given 5/30/25 1425)   iopamidol (ISOVUE-370) 76 % injection 75 mL (75 mLs IntraVENous Given 5/30/25 1348)   sodium chloride 0.9 % bolus 500 mL (500 mLs IntraVENous New Bag 5/30/25 1517)   ceFEPIme (MAXIPIME) 2,000 mg in sodium chloride 0.9 % 100 mL IVPB (addEASE) (0 mg IntraVENous Stopped 5/30/25 1547)   pantoprazole (PROTONIX) injection 40 mg (40 mg IntraVENous Given 5/30/25 1648)       ED Course as of 05/30/25 1700   Fri May 30, 2025   1112 CXR: IMPRESSION:  No acute process. [ER]   1119 The Ekg independently interpreted by me shows  sinus tachycardia, zijn=312  Axis is   Left axis deviation  QTc is  within an acceptable range  Intervals and Durations are unremarkable.      ST Segments: nonspecific changes  Nonspecific change from prior EKG dated 8/10/24 [ER]   1121 The Ekg independently interpreted by me shows  sinus tachycardia, pkby=443   Axis is   Left axis deviation  QTc is  within an acceptable range  Intervals and Durations are unremarkable.      ST Segments: nonspecific changes  No significant change from prior EKG dated 5/30/25 [ER]   1122 Anemia to 11.9.  No leukocytosis or thrombocytopenia [ER]   1122 Patient does have transaminitis, worse than baseline the patient does have abnormal liver enzymes at baseline. [ER]   1122 Hyponatremia 132, hypochloremia 92.  No evidence of kidney dysfunction [ER]   1122 Blood gas shows alkalemia to 7.5, no hypercarbia. Suspect related to hyperventilation [ER]   1122 Troponin elevated to 26, no previous for comparison, may be related to tachycardia, may be related to tachycardia, tachycardia [ER]   1145 D-dimer within normal limits.  Low suspicion for pulmonary embolism [ER]   1201 Ethanol

## 2025-05-31 PROBLEM — R57.9 SHOCK (HCC): Status: ACTIVE | Noted: 2025-05-31

## 2025-05-31 PROBLEM — F13.10 BENZODIAZEPINE ABUSE (HCC): Status: ACTIVE | Noted: 2025-05-31

## 2025-05-31 PROBLEM — N17.9 ACUTE KIDNEY INJURY: Status: ACTIVE | Noted: 2025-01-01

## 2025-05-31 NOTE — OP NOTE
Esophagogastroduodenoscopy Note    Patient:   Rod Herring    YOB: 1970    Facility:     [facility] ICU  [address] [address]   [Inpatient]   Referring/PCP: Alexei Aguirre MD    Procedure:   Esophagogastroduodenoscopy --diagnostic  Date:     5/30/2025   Endoscopist:  Karina Lewis MD     Preoperative Diagnosis:   Hematemesis    Postoperative Diagnosis: Gastric bleed    Anesthesia:  Propofol per anesthesia     Estimated blood loss: Estimated amount of blood loss is 200 ml    Complications: None    Description of Procedure:  Informed consent was obtained from the patient after explanation of the procedure including indications, description of the procedure,  benefits and possible risks and complications of the procedure, and alternatives. Questions were answered.  The patient's history was reviewed and a directed physical examination was performed prior to the procedure.    Patient was monitored throughout the procedure with pulse oximetry and periodic assessment of vital signs. Patient was sedated as noted above. The Nursing staff and I performed a time out.  With the patient in the left lateral decubitus position, the Olympus videoendoscope was placed in the patient's mouth and under direct visualization passed into the esophagus. The scope was ultimately passed to the second portion of the duodenum.  Visualization was performed during both introduction and withdrawal of the endoscope and retroflexed view of the proximal stomach was obtained.     Findings::   Esophagus: Small, deflated, nonbleeding esophageal varices were noted in the distal esophagus. No active bleeding or stigmata of bleeding were noted from those. Signs of inflammation were noted at the GEJ but no active bleeding or tears were noted.   Stomach: There was a large amount of food material and clots in the gastric fundus and proximal body. Different

## 2025-05-31 NOTE — PROGRESS NOTES
Patient's left hand blue, updated Dr. Michaels new order to remove ART line from wrist and order arterial doppler. Right hand appears WDL.

## 2025-05-31 NOTE — PROGRESS NOTES
Pt remains Intubated and Sedated. ET Tube # 7.5 in place. Ll 25.  OG at 80 cm.  See MAR for medications.   May in place.  BUE soft wrist restraints in place due to attempts of pulling at ETT and lines. No signs of injury noted and PROM performed.

## 2025-05-31 NOTE — PROGRESS NOTES
05/30/25 2248   Vent Information   Vent Mode AC/VC   Ventilator Settings   Vt (Set, mL) 440 mL   Resp Rate (Set) 16 bpm   PEEP/CPAP (cmH2O) 5   FiO2  50 %   Vent Patient Data (Readings)   Vt (Measured) 439 mL   Peak Inspiratory Pressure (cmH2O) 19 cmH2O   Rate Measured 16 br/min   Minute Volume (L/min) 7.2 Liters   Peak Inspiratory Flow (lpm) 60 L/sec   Mean Airway Pressure (cmH2O) 8 cmH20   I:E Ratio 1:3.3   Flow Sensitivity 3 L/min   Backup Apnea On   Backup Rate 16 Breaths Per Minute   Backup Vt 440   Vent Alarm Settings   High Pressure (cmH2O) 40 cmH2O   Low Minute Volume (lpm) 4 L/min   High Minute Volume (lpm) 26 L/min   Low Exhaled Vt (ml) 250 mL   High Exhaled Vt (ml) 1100 mL   RR High (bpm) 40 br/min   Apnea (secs) 20 secs   Additional Respiratoray Assessments   Humidification Source Heated wire   Humidification Temp 37   Circuit Condensation Drained   Ambu Bag With Mask At Bedside Yes   ETT    Placement Date/Time: 05/30/25 1900   Present on Admission/Arrival: No  Placed By: Licensed provider  Placement Verified By: Auscultation;Chest X-ray;Colorimetric ETCO2 device  Preoxygenation: Yes  Mask Ventilation: Ventilated by mask (1)  Technique: D...   Secured At 25 cm   Measured From Lips   ETT Placement Center   Secured By Commercial tube james   Site Assessment Dry   Tie/James Changed No   Patient Transport   Time Spent Transporting 46-60   Transport Ventillation Type Transport vent   Transport From ICU   Transport Destination CT scan   Transport Destination ER   Emergency Equipment Included Yes   Ventilator Associated Pneumonia Bundle   Elevation of Head of Bed to 30-45 Degrees  Yes   Oral Care Completed No   Oral Suctioning No   ETT/Trach Suctioning No

## 2025-05-31 NOTE — PROGRESS NOTES
Parents updated by GI provider in the family conference room- all questions answered. Eden Fischer,RN Clinical

## 2025-05-31 NOTE — PROGRESS NOTES
Dr. Michaels at bedside for vascath and ART line placement. Consent obtained from patient's mother at bedside.

## 2025-05-31 NOTE — PROGRESS NOTES
Pulmonary & Critical Care Medicine ICU Progress Note    CC: GI bleed/shock    Events of Last 24 hours:   Propofol 45 mcg/kg/min   Fentanyl 150 mcg/hr   Protonix drip   Octreotide 5 mg/hr   Levophed 60 mcg/min   Neosynephrine 150 mcg/min   Vasopressin 0.03 unit/minute   Bicarb drip 150 cc/hr   RR 26    PEEP 5  FiO2 45%  2 large black BM  NG to suction - CC output 300   Malfunctioning left wrist A-line not reading properly        Vascular lines: IV:   Right IJ   A-line     MV:   Vent Mode: AC/VC Resp Rate (Set): 26 bpm/Vt (Set, mL): 470 mL/ /FiO2 : 45 %  Recent Labs     256 25   PHART 7.207* 7.245*   OFR3AVY 27.5* 25.7*   PO2ART 134.4* 134.3*       IV:   phenylephrine (JAGRUTI-SYNEPHRINE) 50 mg in sodium chloride 0.9 % 250 mL infusion 150 mcg/min (25)    VASOpressin 20 Units in sodium chloride 0.9 % 100 mL infusion 0.04 Units/min (25 0423)    sodium chloride      octreotide (SANDOSTATIN) 500 mcg in sodium chloride 0.9 % 100 mL infusion 50 mcg/hr (25)    pantoprazole (PROTONIX) 80 mg in sodium chloride 0.9 % 100 mL infusion 8 mg/hr (25)    sodium chloride      sodium chloride      dexmedeTOMIDine HCl in NaCl      sodium chloride      norepinephrine 60 mcg/min (2533)    propofol 35 mcg/kg/min (25)    fentaNYL 150 mcg/hr (2557)    sodium chloride      sodium chloride      sodium bicarbonate 150 mEq in dextrose 5 % 1,000 mL infusion 150 mL/hr at 25 0720       Vitals:  Blood pressure (!) 106/59, pulse (!) 126, temperature 98.8 °F (37.1 °C), temperature source Bladder, resp. rate 16, height 1.778 m (5' 10\"), weight 117.5 kg (259 lb 0.7 oz), SpO2 95%.    Temp  Av.1 °F (36.7 °C)  Min: 97.4 °F (36.3 °C)  Max: 99.1 °F (37.3 °C)    Intake/Output Summary (Last 24 hours) at 2025 0727  Last data filed at 2025 0421  Gross per 24 hour   Intake 8732.87 ml   Output 2325 ml   Net 6407.87 ml     EXAM:  General:  ill appearing.  Intubated sedated.  Eyes: No sclera icterus. No conjunctival injection.  ENT: No discharge. ET tube in place  Neck: Trachea midline.   Resp: No accessory muscle use. No crackles. No wheezing. No rhonchi.   CV: Regular rate. Regular rhythm. No mumur or rub. No edema.   GI: Non-tender. Non-distended.   Skin: Warm and dry. No rash on exposed extremities.  M/S: No cyanosis. No clubbing.   Neuro: Intubated sedated.  Not following commands.  Psych: Noncommunicative unable to obtain        Scheduled Meds:   metroNIDAZOLE  500 mg IntraVENous Q8H    cefepime  2,000 mg IntraVENous Q12H    vancomycin  750 mg IntraVENous Q12H    calcium gluconate  2,000 mg IntraVENous Once    insulin regular  10 Units IntraVENous Once    And    dextrose bolus  250 mL IntraVENous Once    sodium chloride flush  5-40 mL IntraVENous 2 times per day    lidocaine 1 % injection  50 mg IntraDERmal Once    sodium chloride flush  5-40 mL IntraVENous 2 times per day    thiamine  100 mg IntraVENous Daily       Data:  CBC:   Recent Labs     05/30/25  1030 05/30/25  1636 05/30/25  1959 05/30/25  2109 05/31/25  0126   WBC 8.8  --   --   --   --    HGB 11.9*   < > 7.0* 8.9* 7.6*   HCT 34.9*   < > 21.7* 27.7* 23.5*   MCV 81.4  --   --   --   --      --   --   --   --     < > = values in this interval not displayed.     BMP:   Recent Labs     05/30/25  1030 05/31/25  0208 05/31/25  0612   * 140 142   K 3.8 6.6* 6.6*   CL 92* 103 103   CO2 21 10* 10*   BUN 16 35* 36*   CREATININE 1.2 1.9* 2.3*     LIVER PROFILE:   Recent Labs     05/30/25  1030 05/30/25  1618 05/31/25  0208 05/31/25  0612   *  --  343* 670*   *  --  200* 304*   LIPASE  --  35.0  37.0  --   --    AMYLASE  --   --   --  47   BILIDIR  --   --   --  2.6*   BILITOT 3.9*  --  3.2* 3.2*   ALKPHOS 236*  --  141* 125         Microbiology:  5/30 BC     Imaging:  Chest x-ray 5/31 imaging reviewed by me and showed  High ETT position 24---> advanced to 25    Satisfactory PICC line position   Satisfactory CVC position   LLL infiltrates     CTA abdomen/pelvis 5/30  1. No evidence for active hemorrhage.   2. Hepatic steatosis.  And early findings for cirrhosis.         CT chest/abdomen/pelvis 5/30 images independently reviewed by me and showed:  1. No evidence of pulmonary embolic disease.   2. Ectasia of the ascending aorta at 4.1 cm.   3. Mildly enlarged main pulmonary artery at 3.5 cm suggesting pulmonary   hypertension.   4. No acute findings within the abdomen or pelvis.  No acute bowel pathology.   5. Hepatic cirrhosis.  Small upper abdominal varices are suggested suggesting   portal hypertension.  Hepatic steatosis with hepatomegaly.   6. Cholelithiasis with no acute features.         ASSESSMENT:  Hemodynamic shock  Acute GI bleed-EGD 5/30 large blood clot in the gastric fundus  Acute blood loss anemia - 2 PRBC 1 platelet and 2 FFPs  Acute kidney injury  Metabolic acidosis/lactic acidosis  Hyperkalemia  Alcohol intoxication  Alcohol abuse-30 beers daily  Alcoholic hepatitis          PLAN:  Mechanical ventilation as per orders for life threatening respiratory failure  Follow ABG and chest x-ray while on the ventilator  Supplemental oxygen to maintain SaO2 >92%; wean as tolerated  IV Propofol for sedation, target RASS -2, with daily spontaneous awakening trial   Follow TG   Fentanyl and Versed PRN, gtt as needed  Head of bed 30 degrees or higher at all times  Daily spontaneous breathing trial once PEEP less than 8, FiO2 less than 55%  Closely monitory airways, clinical status, cardiac rhythm, vital signs, and urine output   Monitory H&H and transfuse for Hb < 7   FFPs to keep INR <1.5  Platelet transfusion to keep platelet >50,000  Octreotide 50 mg/hr   Vancomycin, cefepime, Flagyl D#2  Follow-up cultures  IV Levophed/Vaso/Neos to keep MAP > 65 mmHg or SBP>90   Hydrocortisone 50 mg every 6 hrs   GI and IR following  Librium 25 mg 3 times daily  Rally pack for 3

## 2025-05-31 NOTE — PROGRESS NOTES
Prior to receiving transfusion of blood products, patient/ Familyeducated on the following:    Blood product transfusion process  Benefits of receiving blood product transfusion  Risks associated with receiving the transfusion  Signs and symptoms of complications associated with blood product transfusion  Vague, uneasy feeling  Onset of pain (especially at the IV site, back, or chest)  Chills  Flushing  Fever  Nausea  Dizziness  Rash  Itching  Dark or red urine  Instructed patient to notify RN immediately if any sign or symptom occurs

## 2025-05-31 NOTE — CONSULTS
Pharmacy Note  Vancomycin Consult    Rod PABLO Herring is a 55 y.o. male started on Vancomycin for Sepsis/Intra-abdominal infection; consult received from Dr. Mayer to manage therapy. Also receiving the following antibiotics: Cefepime.    Allergies:  Patient has no known allergies.     Tmax:     Micro:     Recent Labs     05/30/25  1030 05/31/25  0208   CREATININE 1.2 1.9*       Recent Labs     05/30/25  1030   WBC 8.8       Estimated Creatinine Clearance: 56 mL/min (A) (based on SCr of 1.9 mg/dL (H)).      Intake/Output Summary (Last 24 hours) at 5/31/2025 0528  Last data filed at 5/31/2025 0421  Gross per 24 hour   Intake 8732.87 ml   Output 2325 ml   Net 6407.87 ml       Wt Readings from Last 1 Encounters:   05/31/25 117.5 kg (259 lb 0.7 oz)         Body mass index is 37.17 kg/m².    Loading dose (critically ill or in ICU, require dialysis or renal replacement therapy): Vancomycin 25 mg/kg IVPB x 1 (maximum 2500 mg).  Maintenance dose: 10-20 mg/kg (maximum: 2000 mg/dose and 4500 mg/day) starting at the next dosing interval determined by renal function  Goal Vancomycin trough: 15-20 mcg/mL   Goal Vancomycin AUC: 400-600     Assessment/Plan:  Will initiate Vancomycin with a one time loading dose of 2500 mg x1, followed by 750 mg IV every 12 hours. Calculated Vancomycin AUC = 535 mg/L*h with an estimated steady-state vancomycin trough = 17.3 mcg/mL. Vancomycin level ordered for 5/31 at 1900. Timing of trough level will be determined based on culture results, renal function, and clinical response.     Thank you for the consult.  Rod Reyes, PharmD  5/31/2025 5:30 AM

## 2025-05-31 NOTE — PROCEDURES
PROCEDURE NOTE  Date: 5/30/2025   Name: Rod Herring  YOB: 1970    Insert Arterial Line    Date/Time: 5/30/2025 11:29 PM    Performed by: Zaire Mayer MD  Authorized by: Zaire Mayer MD  Consent: Verbal consent obtained. Written consent obtained.  Consent given by: parent  Patient identity confirmed: arm band and hospital-assigned identification number  Time out: Immediately prior to procedure a \"time out\" was called to verify the correct patient, procedure, equipment, support staff and site/side marked as required.  Preparation: Patient was prepped and draped in the usual sterile fashion.  Indications: multiple ABGs, respiratory failure and hemodynamic monitoring  Location: left radial  Post-procedure: line sutured and dressing applied  Patient tolerance: patient tolerated the procedure well with no immediate complications          Arterial line placed for frequent hemodynamic monitoring.  Cuff pressures appear to be fluctuating and patient needing frequent lab draws.  Left wrist was prepped with ChloraPrep, sterile field in place and arterial line placed in the left radial artery without acute issue.  Arterial line was placed easily, 1 attempt without any complications.  Upon completion, waveform verified, able to draw back blood.

## 2025-05-31 NOTE — PROGRESS NOTES
4 Eyes Skin Assessment     NAME:  Rod Herring  YOB: 1970  MEDICAL RECORD NUMBER:  [id number]    The patient is being assessed for  Admission    I agree that at least one RN has performed a thorough Head to Toe Skin Assessment on the patient. ALL assessment sites listed below have been assessed.      Areas assessed by both nurses:    Head, Face, Ears, Shoulders, Back, Chest, Arms, Elbows, Hands, Sacrum. Buttock, Coccyx, Ischium, Legs. Feet and Heels, and Under Medical Devices         Does the Patient have a Wound? No noted wound(s)       Oscar Prevention initiated by RN: Yes  Wound Care Orders initiated by RN: Yes    Pressure Injury (Stage 3,4, Unstageable, DTI, NWPT, and Complex wounds) if present, place Wound referral order by RN under : No    New Ostomies, if present place, Ostomy referral order under : No     Nurse 1 eSignature: Electronically signed by Shai Hill RN on 5/31/25 at 4:05 AM EDT    **SHARE this note so that the co-signing nurse can place an eSignature**    Nurse 2 eSignature: Electronically signed by Tera Garcia RN on 5/31/25 at 4:11 AM EDT     Patient is not able to demonstrated the ability to move from a reclining position to an upright position within the recliner. Patient is confused, demented and /or unable to follow instruction.

## 2025-05-31 NOTE — PROGRESS NOTES
IM Progress Note    Admit Date:  5/30/2025  1    Interval history:  presented for alcohol detox but later with GI bleed, hypotensive, anemic , transferred to ICU   Placed TLC, started pressors   EGD with oozing but no active bleed, old clot noted  CTA with no bleed  Now on vent support with severe hypotensive shock on 3 pressors     Subjective:  Mr. Herring seen sedated on vent , on multiple pressors  Family ( elderly parents ) at bedside    Objective:   BP (!) 108/58   Pulse (!) 124   Temp 98.8 °F (37.1 °C) (Bladder) Comment: Simultaneous filing. User may not have seen previous data. Comment (Src): Simultaneous filing. User may not have seen previous data.  Resp 20   Ht 1.778 m (5' 10\")   Wt 117.5 kg (259 lb 0.7 oz)   SpO2 96%   BMI 37.17 kg/m²     Intake/Output Summary (Last 24 hours) at 5/31/2025 0706  Last data filed at 5/31/2025 0421  Gross per 24 hour   Intake 8732.87 ml   Output 2325 ml   Net 6407.87 ml       Physical Exam:        General: middle aged male on vent support  Oral ETT and OG noted  Right IJ TLC Appears to be not in any distress  Mucous Membranes:  Pink , anicteric  Neck: No JVD, no carotid bruit, no thyromegaly  Chest:  Clear to auscultation bilaterally, diminished in bases  Cardiovascular:  RRR S1S2 heard, no murmurs or gallops  Abdomen:  Soft, +distended, non tender, no organomegaly, BS present  Extremities: No edema or cyanosis. Well perfused Distal pulses well felt  Neurological : sedated    Medications:   Scheduled Medications:    metroNIDAZOLE  500 mg IntraVENous Q8H    cefepime  2,000 mg IntraVENous Q12H    vancomycin  750 mg IntraVENous Q12H    insulin regular  5 Units IntraVENous Once    And    dextrose bolus  250 mL IntraVENous Once    calcium gluconate  2,000 mg IntraVENous Once    sodium chloride flush  5-40 mL IntraVENous 2 times per day    lidocaine 1 % injection  50 mg IntraDERmal Once    sodium chloride flush  5-40 mL IntraVENous 2 times per day    thiamine  100 mg  AM

## 2025-05-31 NOTE — PROGRESS NOTES
NG placed in right nare and placement confirmed by direct visualization with EGD scope by Dr. Rabago.

## 2025-05-31 NOTE — PROCEDURES
PROCEDURE NOTE  Date: 5/31/2025   Name: Rod Herring  YOB: 1970    Procedures     Procedure: Right femoral arterial line placement.     Indication: invasive hemodynamic monitoring, shook    Informed Consent: was obtained from family. Informed consent was obtained from the family.  Risks of  hemorrhage, arrhythmia, infection and adverse drug reaction were discussed.      Time Out: taken    Procedure: Sterile prep with chlorhexidine.  Full maximum sterile field/barrier technique was followed (with cap and mask and sterile gown and large sterile sheet and hand hygeine and 2% chlorhexidine).  Aqueous lidocaine anesthetic.  Right femoral artery was accessed with ease with bright red blood return. No immediate complication.    Recommendation: BP monitoring and wean pressors targeting MAP>65.

## 2025-05-31 NOTE — PROGRESS NOTES
Call to Dr. Michaels to make sure that he is ok with pt going to CT for CTA abd pelvis, since during intubation/EGD, he had said not to move him due to instability.

## 2025-05-31 NOTE — PLAN OF CARE
Notified patient clinical status deteriorating.  Needed to go up on Noam-Synephrine drip.  Currently on 3 pressors-Levophed, vasopressin and Noam-Synephrine    Lactic acid continues to go up secondary to shock state.    Ischemic colitis also on the differential although CTA abdomen pelvis did not note this and in any case, patient too unstable for any intervention    Lactic acid now 14.3.  Potassium 6.4  Patient already on bicarbonate infusion.  Discussed with bedside RN and patient and also nephrology for consideration of CRRT although patient very critically ill and unclear if patient would even tolerate this.    Hemoglobin resulted with H&H showing hemoglobin 7.6.  Need to be cautious about over transfusing as to not worsen portal hypertension    Physical exam:  - Patient remains intubated, sedated, unresponsive    Plan:  - Insulin, dextrose, calcium gluconate for hyperkalemia  - Continue bicarbonate infusion  - Nephrology consult-page sent out for consideration of CRRT if indicated  - Broaden antibiotics to vancomycin, cefepime plus Flagyl to cover intra-abdominal infection.  would expect patient's blood pressure to improve if purely Hemorrhagic shock with blood products but patient remains persistently in shock  - Will give another amp of bicarb     Patient is very critically ill, high risk of deterioration and death.  This was discussed with patient's family earlier in the night given patient's shock state, continued rising lactic acid, severe acidemia.    Update: Discussed with nephrology around 2:50 AM.  Patient may be a candidate for CRRT, will need to monitor labs and will be evaluated in the morning.  For now we will continue bicarb infusion, treatment for hyperkalemia with insulin, calcium    Notified by bedside RN the patient status was declining and patient was seen at bedside again.  An additional 31 minutes of critical care time spent in care of this patient

## 2025-05-31 NOTE — PROGRESS NOTES
05/31/25 0259   Patient Observation   Pulse (!) 126   Respirations 21   SpO2 98 %   Breath Sounds   Breath Sounds Bilateral Diminished   Vent Information   Vent Mode AC/VC   Ventilator Settings   Vt (Set, mL) 470 mL   Resp Rate (Set) 26 bpm   PEEP/CPAP (cmH2O) 5   FiO2  60 %   Vent Patient Data (Readings)   Vt (Measured) 486 mL   Peak Inspiratory Pressure (cmH2O) 24 cmH2O   Rate Measured 30 br/min   Minute Volume (L/min) 12.7 Liters   Peak Inspiratory Flow (lpm) 60 L/sec   Mean Airway Pressure (cmH2O) 8 cmH20   I:E Ratio 1:1.7   Flow Sensitivity 3 L/min   Backup Apnea On   Backup Rate 16 Breaths Per Minute   Backup Vt 440   Vent Alarm Settings   High Pressure (cmH2O) 40 cmH2O   Low Minute Volume (lpm) 4 L/min   High Minute Volume (lpm) 26 L/min   Low Exhaled Vt (ml) 250 mL   High Exhaled Vt (ml) 1100 mL   RR High (bpm) 40 br/min   Apnea (secs) 20 secs   Additional Respiratoray Assessments   Humidification Source Heated wire   Humidification Temp 37   Circuit Condensation Drained   Ambu Bag With Mask At Bedside Yes   Airway Clearance   Suction ET Tube   Subglottic Suction Done No   Suction Device Inline suction catheter   Sputum Method Obtained Endotracheal   Sputum Amount Scant   ETT    Placement Date/Time: 05/30/25 1900   Present on Admission/Arrival: No  Placed By: Licensed provider  Placement Verified By: Auscultation;Chest X-ray;Colorimetric ETCO2 device  Preoxygenation: Yes  Mask Ventilation: Ventilated by mask (1)  Technique: D...   Secured At 25 cm   Measured From Lips   ETT Placement Right   Secured By Commercial tube james   Site Assessment Dry   Tie/James Changed No   Ventilator Associated Pneumonia Bundle   Elevation of Head of Bed to 30-45 Degrees  Yes   Oral Care Completed No   Oral Suctioning No   ETT/Trach Suctioning Yes

## 2025-05-31 NOTE — PLAN OF CARE
New consult received  D/W the ICU physician  Patient started on bicarb drip for profound acidosis/hyperkalemia  Repeat labs in 3 hours  If persistently acidotic will need CRRT    Full consult to follow shortly    Laurie Gomes MD  [facility]

## 2025-05-31 NOTE — PROGRESS NOTES
Care rounds complete with Dr. Michaels. Planning to start CRRT today. Dr. Michaels will place lines.

## 2025-05-31 NOTE — PROGRESS NOTES
Patient with bloody emesis. Tachycardic, hypotensive. Diaphoretic. Called Dr enriquez and Dr Lewis. Orders added. See MAR and orders

## 2025-05-31 NOTE — PROCEDURES
PROCEDURE NOTE  Date: 5/31/2025   Name: Rod Herring  YOB: 1970    Procedures    Procedure: Left IJ nontunneled central venous catheter insertion    Indication: Need for dialysis vascular access    Informed Consent: was obtained from family     Time Out: taken    Anesthesia: Local skin infiltration with lidocaine    Procedure: Sterile prep with chlorhexidine.  Full maximum sterile field/barrier technique was followed (with cap and mask and sterile gown and large sterile sheet and hand hygeine and 2% chlorhexidine).  Aqueous lidocaine anesthetic. Using direct ultrasound guidance, the IJ vein was visualized. An 18-gauge needle was then inserted into the vein using direct ultrasound guidance. A guide wire was then passed easily through the needle. The needle was removed and the tissue of the tract was dilated using dilators from the kit after a small knick was made at the skin entry site. Afterwards, a 20 cm temporary HD catheter was inserted into the vein. The catheter had good flow when tested using a 10 ml syringe. Good dark venous blood from all 3 lumens.  The catheter was locked. No immediate complication with no changes to vital signs.  Patient tolerated procedure well. CXR confirmed appropriate placement in mid to distal SVC.      Recommendation: remove HD catheter at earliest time feasible to mitigate infectious risks

## 2025-05-31 NOTE — PROGRESS NOTES
05/31/25 1913   Patient Observation   Pulse (!) 134   Respirations 18   Breath Sounds   Breath Sounds Bilateral Clear;Diminished   Right Upper Lobe Clear   Right Middle Lobe Clear   Right Lower Lobe Diminished   Left Upper Lobe Clear   Left Lower Lobe Diminished   Vent Information   Vent Mode AC/VC   Ventilator Settings   Vt (Set, mL) 470 mL   Resp Rate (Set) 26 bpm   PEEP/CPAP (cmH2O) 8   FiO2  50 %   Vent Patient Data (Readings)   Vt (Measured) 473 mL   Peak Inspiratory Pressure (cmH2O) 29 cmH2O   Rate Measured 38 br/min   Minute Volume (L/min) 17.3 Liters   Peak Inspiratory Flow (lpm) 75 L/sec   Mean Airway Pressure (cmH2O) 8.2 cmH20   I:E Ratio 1:2.4   Flow Sensitivity 3 L/min   Backup Apnea On   Backup Rate 26 Breaths Per Minute   Backup Vt 470   Vent Alarm Settings   High Pressure (cmH2O) 40 cmH2O   Low Minute Volume (lpm) 4 L/min   High Minute Volume (lpm) 26 L/min   Low Exhaled Vt (ml) 250 mL   High Exhaled Vt (ml) 1100 mL   RR High (bpm) 40 br/min   Apnea (secs) 20 secs   Additional Respiratoray Assessments   Humidification Source Heated wire   Humidification Temp 37   Circuit Condensation Drained   Ambu Bag With Mask At Bedside Yes   Airway Clearance   Suction ET Tube   Subglottic Suction Done No   Suction Device Inline suction catheter   Sputum Method Obtained Endotracheal   Sputum Amount Scant   ETT    Placement Date/Time: 05/30/25 1900   Present on Admission/Arrival: No  Placed By: Licensed provider  Placement Verified By: Auscultation;Chest X-ray;Colorimetric ETCO2 device  Preoxygenation: Yes  Mask Ventilation: Ventilated by mask (1)  Technique: D...   Secured At 25 cm   Measured From Lips   ETT Placement Right   Secured By Commercial tube james   Site Assessment Dry   Cuff Pressure   (MOV)   Tie/James Changed No   Ventilator Associated Pneumonia Bundle   Elevation of Head of Bed to 30-45 Degrees  Yes   Oral Care Completed No   Oral Suctioning No   ETT/Trach Suctioning Yes

## 2025-05-31 NOTE — CONSULTS
Nephrology Consult Note    Reason for Consult:  rachael, hyperkalemia, acidosis  Requesting Physician:  Dr. Alvarado    Chief Complaint:  found down  History Obtained From:  mother, father, electronic medical record, reason patient could not give history:  altered mental status and on ventilator    History of Present Ilness:    55 year old male admitted with chest pain and dyspnea.  He came in to the ED last evening and quickly deteriorated.  He is now intubated and on 3 doses of vasopressors.  He is being treated for GI hemorrhage.  He had endoscopy yesterday.  He is now acidotic/acidemic, he has elevated K and RACHAEL.  His serum lactic acid is now up to 18.    UOP is okay after placement of artis catheter.  His K remains high at 6.6.  Systemic pH is better.      Past Medical History:        Diagnosis Date    Bronchitis     Depression     ETOH abuse     Prostate enlargement        Past Surgical History:        Procedure Laterality Date    LEG SURGERY      hamstring left    TONSILLECTOMY         Current Medications:    Scheduled Meds:   metroNIDAZOLE  500 mg IntraVENous Q8H    cefepime  2,000 mg IntraVENous Q12H    vancomycin  750 mg IntraVENous Q12H    calcium gluconate  2,000 mg IntraVENous Once    hydrocortisone sodium succinate PF  50 mg IntraVENous Q6H    sodium chloride flush  5-40 mL IntraVENous 2 times per day    lidocaine 1 % injection  50 mg IntraDERmal Once    sodium chloride flush  5-40 mL IntraVENous 2 times per day    thiamine  100 mg IntraVENous Daily     Continuous Infusions:   phenylephrine (JAGRUTI-SYNEPHRINE) 50 mg in sodium chloride 0.9 % 250 mL infusion 150 mcg/min (05/31/25 0737)    VASOpressin 20 Units in sodium chloride 0.9 % 100 mL infusion 0.04 Units/min (05/31/25 0737)    sodium chloride      octreotide (SANDOSTATIN) 500 mcg in sodium chloride 0.9 % 100 mL infusion 50 mcg/hr (05/31/25 0737)    pantoprazole (PROTONIX) 80 mg in sodium chloride 0.9 % 100 mL infusion 8 mg/hr (05/31/25 0737)

## 2025-05-31 NOTE — PROGRESS NOTES
Shift assessment complete.    Patient seen intubated and sedated. RASS - 1, patient wakes but does not follow commands. Patient intubated with a # 7.5 ETT at the 25 LL. Ventilator settings are currently AC 26/470/45/+5. Patient with no s/s of pain. Physical assessment as charted in flow sheets. Scheduled medications given per orders. Patient's family at bedside.

## 2025-05-31 NOTE — PROGRESS NOTES
Pt transferred from ER to ICU room 4.  Pt c/o severe abdominal pain and nausea.   This RN reviewed code status with pt, who says that he wants all life saving measures, including, CPR, ventilation, and even dialysis. Pt connected to ICU monitoring and oriented to room.  Pt states that his mother, Faby is his emergency contact.   He does not have any adult children, or a legal POA.  Pt restless in bed and lethargic, but is able to answer all orientation questions.

## 2025-05-31 NOTE — PROGRESS NOTES
Prior to receiving transfusion of blood products, patient educated on the following:    Blood product transfusion process  Benefits of receiving blood product transfusion  Risks associated with receiving the transfusion  Signs and symptoms of complications associated with blood product transfusion  Vague, uneasy feeling  Onset of pain (especially at the IV site, back, or chest)  Chills  Flushing  Fever  Nausea  Dizziness  Rash  Itching  Dark or red urine  Instructed patient to notify RN immediately if any sign or symptom occurs     Blood consent signed by patient's mother

## 2025-05-31 NOTE — PLAN OF CARE
Problem: Safety - Adult  Goal: Free from fall injury  Outcome: Progressing  Flowsheets (Taken 5/31/2025 0403)  Free From Fall Injury: Instruct family/caregiver on patient safety     Problem: Pain  Goal: Verbalizes/displays adequate comfort level or baseline comfort level  Outcome: Progressing  Flowsheets (Taken 5/31/2025 0403)  Verbalizes/displays adequate comfort level or baseline comfort level:   Encourage patient to monitor pain and request assistance   Assess pain using appropriate pain scale   Administer analgesics based on type and severity of pain and evaluate response     Problem: Safety - Medical Restraint  Goal: Remains free of injury from restraints (Restraint for Interference with Medical Device)  Description: INTERVENTIONS:1. Determine that other, less restrictive measures have been tried or would not be effective before applying the restraint2. Evaluate the patient's condition at the time of restraint application3. Inform patient/family regarding the reason for restraint4. Q2H: Monitor safety, psychosocial status, comfort, nutrition and hydration  Outcome: Progressing  Flowsheets  Taken 5/31/2025 0403  Remains free of injury from restraints (restraint for interference with medical device): Every 2 hours: Monitor safety, psychosocial status, comfort, nutrition and hydration  Taken 5/31/2025 0200  Remains free of injury from restraints (restraint for interference with medical device): Every 2 hours: Monitor safety, psychosocial status, comfort, nutrition and hydration  Taken 5/31/2025 0000  Remains free of injury from restraints (restraint for interference with medical device): Every 2 hours: Monitor safety, psychosocial status, comfort, nutrition and hydration  Taken 5/30/2025 2200  Remains free of injury from restraints (restraint for interference with medical device): Every 2 hours: Monitor safety, psychosocial status, comfort, nutrition and hydration  Taken 5/30/2025 2000  Remains free of injury

## 2025-05-31 NOTE — PROGRESS NOTES
PROGRESS NOTE  S:55 yrs Patient  admitted on 5/30/2025 with Palpitations [R00.2]  GI bleed [K92.2]  Upper GI bleed [K92.2]  Elevated troponin [R79.89]  Intermittent lightheadedness [R42]  Benzodiazepine abuse (HCC) [F13.10]  Alcohol withdrawal syndrome without complication (HCC) [F10.930]  Hypotension, unspecified hypotension type [I95.9]  Hematemesis with nausea [K92.0]  Sepsis, due to unspecified organism, unspecified whether acute organ dysfunction present (HCC) [A41.9] .    Remains intubated and sedated in the ICU. Had a dark BM per the staff. NG in place with dark material but no red blood.     Current Hospital Schedued Meds   metroNIDAZOLE  500 mg IntraVENous Q8H    cefepime  2,000 mg IntraVENous Q12H    vancomycin  750 mg IntraVENous Q12H    calcium gluconate  2,000 mg IntraVENous Once    dextrose bolus  250 mL IntraVENous Once    hydrocortisone sodium succinate PF  50 mg IntraVENous Q6H    sodium chloride flush  5-40 mL IntraVENous 2 times per day    lidocaine 1 % injection  50 mg IntraDERmal Once    sodium chloride flush  5-40 mL IntraVENous 2 times per day    thiamine  100 mg IntraVENous Daily     Current Hospital IV Meds   phenylephrine (JAGRUTI-SYNEPHRINE) 50 mg in sodium chloride 0.9 % 250 mL infusion 150 mcg/min (05/31/25 0737)    VASOpressin 20 Units in sodium chloride 0.9 % 100 mL infusion 0.04 Units/min (05/31/25 0737)    sodium chloride      octreotide (SANDOSTATIN) 500 mcg in sodium chloride 0.9 % 100 mL infusion 50 mcg/hr (05/31/25 0737)    pantoprazole (PROTONIX) 80 mg in sodium chloride 0.9 % 100 mL infusion 8 mg/hr (05/31/25 0737)    sodium chloride      sodium chloride      dexmedeTOMIDine HCl in NaCl      sodium chloride      norepinephrine 60 mcg/min (05/31/25 0737)    propofol 35 mcg/kg/min (05/31/25 0737)    fentaNYL 150 mcg/hr (05/31/25 0737)    sodium chloride      sodium chloride      sodium bicarbonate 150 mEq in dextrose 5 % 1,000 mL infusion 150  pelvis. Abdominal aorta/Branches:Abdominal aorta is patent and normal size.  Negative for dissection.  The celiac axis, SMA, and EDGARDO are patent.  Renal arteries are patent and unremarkable. Organs: The liver is hypodense with a slightly nodular contour.  It is not shrunken but mildly enlarged.  The spleen is not enlarged.  Kidneys show normal enhancement.  Negative for hydronephrosis.  The adrenal glands are normal.  The pancreas is unremarkable.  High-density material in the gallbladder with mild pericholecystic fluid.  This may be in part secondary to vicarious excretion of contrast. GI/Bowel: The stomach is unremarkable in appearance.  No small bowel dilation.  No colonic dilation on wall thickening.No evidence for active hemorrhage the but there is oral contrast within the distal small bowel and the colon.  No varices are appreciated. Peritoneum/Retroperitoneum: No enlarged lymphadenopathy. No free fluid.  No free gas. Bones/Soft Tissues: SpondylosisNo subcutaneous mass. CTA PELVIS: Aorta/Iliacs: Iliac arteries are patent.  No dissection.  Atherosclerotic changes. Other: No free fluid.  No large mass May catheter and gas in the bladder. The bladder is decompressed. Bones/Soft Tissues: Osteoarthritic changes of the SI joints and the hips.     1. No evidence for active hemorrhage. 2. Hepatic steatosis.  And early findings for cirrhosis.     XR CHEST PORTABLE  Result Date: 5/30/2025  EXAM: 1 VIEW XRAY OF THE CHEST 05/30/2025 09:54:43 PM COMPARISON: None available. CLINICAL HISTORY: S/p intubation. New intubation (NG), please verify position. FINDINGS: LUNGS AND PLEURA: No focal pulmonary opacity. No pulmonary edema. No pleural effusion. No pneumothorax. HEART AND MEDIASTINUM: No acute abnormality of the cardiac and mediastinal silhouettes. BONES AND SOFT TISSUES: No acute osseous abnormality. LINES AND TUBES: Endotracheal tube terminates 6 cm above the mariela. Enteric tube courses below the diaphragm. Right IJ  Interpretation:          Indeterminate     Confirmatory study:          What confirmatory study was done?:  CT         Confirmatory study findings::  CT scan showed no evidence of pericardial effusion, so suspect pericardial fat pad.  Do suspect that patient could tolerate more fluid  Electronically signed by Jennifer Bell on Friday, May 30, 2025 at 5:19 PM : Jennifer Bell Attending: Jennifer Bell     CT HEAD WO CONTRAST  Result Date: 5/30/2025  EXAMINATION: CT OF THE HEAD WITHOUT CONTRAST  5/30/2025 1:41 pm TECHNIQUE: CT of the head was performed without the administration of intravenous contrast. Automated exposure control, iterative reconstruction, and/or weight based adjustment of the mA/kV was utilized to reduce the radiation dose to as low as reasonably achievable. COMPARISON: None. HISTORY: ORDERING SYSTEM PROVIDED HISTORY: Dizziness. TECHNOLOGIST PROVIDED HISTORY: Reason for exam:->Dizziness. Has a \"code stroke\" or \"stroke alert\" been called?->No Decision Support Exception - unselect if not a suspected or confirmed emergency medical condition->Emergency Medical Condition (MA) Reason for Exam: dizziness FINDINGS: BRAIN/VENTRICLES: There is no acute intracranial hemorrhage, mass effect or midline shift.  No abnormal extra-axial fluid collection.  The gray-white differentiation is maintained without evidence of an acute infarct.  There is no evidence of hydrocephalus. ORBITS: The visualized portion of the orbits demonstrate no acute abnormality. SINUSES: The visualized paranasal sinuses and mastoid air cells demonstrate no acute abnormality. SOFT TISSUES/SKULL:  No acute abnormality of the visualized skull or soft tissues.     No acute intracranial abnormality.     XR CHEST PORTABLE  Result Date: 5/30/2025  EXAMINATION: ONE XRAY VIEW OF THE CHEST 5/30/2025 10:30 am COMPARISON: 07/31/2016 HISTORY: ORDERING SYSTEM PROVIDED HISTORY: sob TECHNOLOGIST PROVIDED HISTORY: Reason for exam:->sob Reason for Exam:

## 2025-06-01 PROBLEM — D68.9 COAGULOPATHY: Status: ACTIVE | Noted: 2025-01-01

## 2025-06-01 NOTE — PLAN OF CARE
Problem: Pain  Goal: Verbalizes/displays adequate comfort level or baseline comfort level  Outcome: Progressing  Flowsheets (Taken 6/1/2025 0038)  Verbalizes/displays adequate comfort level or baseline comfort level:   Encourage patient to monitor pain and request assistance   Assess pain using appropriate pain scale     Problem: Safety - Medical Restraint  Goal: Remains free of injury from restraints (Restraint for Interference with Medical Device)  Description: INTERVENTIONS:1. Determine that other, less restrictive measures have been tried or would not be effective before applying the restraint2. Evaluate the patient's condition at the time of restraint application3. Inform patient/family regarding the reason for restraint4. Q2H: Monitor safety, psychosocial status, comfort, nutrition and hydration  Outcome: Progressing  Flowsheets  Taken 6/1/2025 0038 by Shai Hill RN  Remains free of injury from restraints (restraint for interference with medical device): Every 2 hours: Monitor safety, psychosocial status, comfort, nutrition and hydration  Taken 6/1/2025 0000 by Shai Hill RN  Remains free of injury from restraints (restraint for interference with medical device): Every 2 hours: Monitor safety, psychosocial status, comfort, nutrition and hydration  Taken 5/31/2025 2200 by Shai Hill RN  Remains free of injury from restraints (restraint for interference with medical device): Every 2 hours: Monitor safety, psychosocial status, comfort, nutrition and hydration  Taken 5/31/2025 2000 by Shai Hill RN  Remains free of injury from restraints (restraint for interference with medical device): Every 2 hours: Monitor safety, psychosocial status, comfort, nutrition and hydration  Taken 5/31/2025 1800 by Nila Wilcox RN  Remains free of injury from restraints (restraint for interference with medical device): Every 2 hours: Monitor safety, psychosocial status, comfort, nutrition and hydration  Taken

## 2025-06-01 NOTE — PROGRESS NOTES
Dr. Herring at bedside, CRRT fluids updated and volumes changed on CRRT machine.    Dr. Herring discussed code status with patient's family and states he will update the order.

## 2025-06-01 NOTE — PROGRESS NOTES
TMP and filter pressure elevated requiring decrease of blood flow rate to 175 to run machine. This RN preparing to change CRRT filter. Family aware patient may not tolerate filter change. Family agreeable and wishes to proceed, currently visiting with patient and wish to have a few moments with him prior to changing.

## 2025-06-01 NOTE — PROGRESS NOTES
Pt remains Intubated and Sedated. ET tube # 7.5 in place. LL 25. OG at 80 cm, connected to LWIS.  Vent setting AC/VC 26/470/50/8.  Noam-Synephrine infusing at 150 mcg/min.  Vasopressin @ 0.04 Units/min.  Levophed @ 60 mcg/min.  Fentanyl @ 175 mcg//hr.  Propofol infusing at 50 mcg/kg/min.     Protonix drip @ 8 mg/hr.  Octreotide drip @ 50 mcg/hr.  D 5% LR @ 75 ml/hr.  Na Bicarb 150 mEq in Dex 5 % @ 150 ml/hr.  CRRT continues. Blood flow @ 250 ml/hr.  Pre and Post blood infusing at 500 ml/hr.  Dialysate infusing at 1500 ml/hr.  Trying to keep Pt even per nephrology.  Right I/J CVC and Left Temporary Vas cath I/J in place along with one PIV.  Temperature sensing May in place.   Flexi seal in place.  Right Femoral arterial line.  BUE soft wrist restraints in place due to attempts of pulling at ETT and lines. No signs of injury noted and PROM performed.

## 2025-06-01 NOTE — PROGRESS NOTES
4 Eyes Skin Assessment     NAME:  Rod Herring  YOB: 1970  MEDICAL RECORD NUMBER:  [id number]    The patient is being assessed for  Shift Handoff    I agree that at least one RN has performed a thorough Head to Toe Skin Assessment on the patient. ALL assessment sites listed below have been assessed.      Areas assessed by both nurses:    Head, Face, Ears, Shoulders, Back, Chest, Arms, Elbows, Hands, Sacrum. Buttock, Coccyx, Ischium, Legs. Feet and Heels, and Under Medical Devices         Does the Patient have a Wound? Yes wound(s) were present on assessment. LDA wound assessment was Initiated and completed by RN     Redness on Right ear.  Redness and swelling on Left hand and arm.    Oscar Prevention initiated by RN: Yes  Wound Care Orders initiated by RN: Yes    Pressure Injury (Stage 3,4, Unstageable, DTI, NWPT, and Complex wounds) if present, place Wound referral order by RN under : No    New Ostomies, if present place, Ostomy referral order under : No     Nurse 1 eSignature: Electronically signed by Shai Hill RN on 6/1/25 at 12:40 AM EDT    **SHARE this note so that the co-signing nurse can place an eSignature**    Nurse 2 eSignature: {Esignature:[id number]}     Patient is not able to demonstrated the ability to move from a reclining position to an upright position within the recliner. Patient is confused, demented and /or unable to follow instruction.

## 2025-06-01 NOTE — PROGRESS NOTES
Care rounds complete with Dr. Michaels. POC discussed with parents at bedside, may wish to withdraw care, but are wanting to discuss with daughter when she gets here.

## 2025-06-01 NOTE — PROGRESS NOTES
Prior to receiving transfusion of blood products, patient educated on the following:    Blood product transfusion process  Benefits of receiving blood product transfusion  Risks associated with receiving the transfusion  Signs and symptoms of complications associated with blood product transfusion  Vague, uneasy feeling  Onset of pain (especially at the IV site, back, or chest)  Chills  Flushing  Fever  Nausea  Dizziness  Rash  Itching  Dark or red urine  Instructed patient to notify RN immediately if any sign or symptom occurs     NOK at bedside and agreeable to blood transfusion

## 2025-06-01 NOTE — PROGRESS NOTES
PROGRESS NOTE  S:55 yrs Patient  admitted on 5/30/2025 with Palpitations [R00.2]  GI bleed [K92.2]  Upper GI bleed [K92.2]  Elevated troponin [R79.89]  Intermittent lightheadedness [R42]  Benzodiazepine abuse (HCC) [F13.10]  Alcohol withdrawal syndrome without complication (HCC) [F10.930]  Hypotension, unspecified hypotension type [I95.9]  Hematemesis with nausea [K92.0]  Sepsis, due to unspecified organism, unspecified whether acute organ dysfunction present (HCC) [A41.9] .    Patient is intubated and presently on CRRT.  Per the staff there is no further bloody output from his NG tube or overt GI bleeding.  He is on multiple pressors.  Family at the bedside.    Current Hospital Schedued Meds   albumin human 25%        metroNIDAZOLE  500 mg IntraVENous Q8H    hydrocortisone sodium succinate PF  50 mg IntraVENous Q6H    balsum peru-castor oil   Topical BID    vancomycin (VANCOCIN) intermittent dosing (placeholder)   Other RX Placeholder    cefepime  2,000 mg IntraVENous Q8H    lidocaine 1 % injection  50 mg IntraDERmal Once    sodium chloride flush  5-40 mL IntraVENous 2 times per day    thiamine  100 mg IntraVENous Daily     Current Hospital IV Meds   sodium chloride      prismaSol BGK 2/3.5 1,000 mL/hr at 06/01/25 0850    prismaSol BGK 2/3.5 1,000 mL/hr at 06/01/25 0909    prismaSol BGK 2/3.5 2,000 mL/hr at 06/01/25 0850    phenylephrine (JAGRUTI-SYNEPHRINE) 50 mg in sodium chloride 0.9 % 250 mL infusion 175 mcg/min (06/01/25 1200)    VASOpressin 20 Units in sodium chloride 0.9 % 100 mL infusion 0.04 Units/min (06/01/25 1200)    dextrose      dextrose      dextrose 5% in lactated ringers 75 mL/hr at 06/01/25 1200    octreotide (SANDOSTATIN) 500 mcg in sodium chloride 0.9 % 100 mL infusion 50 mcg/hr (06/01/25 1200)    pantoprazole (PROTONIX) 80 mg in sodium chloride 0.9 % 100 mL infusion 8 mg/hr (06/01/25 0900)    sodium chloride      dexmedeTOMIDine HCl in NaCl      norepinephrine  performed after the administration of intravenous contrast.  Multiplanar reformatted images are provided for review.  MIP images are provided for review. Automated exposure control, iterative reconstruction, and/or weight based adjustment of the mA/kV was utilized to reduce the radiation dose to as low as reasonably achievable.; CT of the abdomen and pelvis was performed with the administration of intravenous contrast. Multiplanar reformatted images are provided for review. Automated exposure control, iterative reconstruction, and/or weight based adjustment of the mA/kV was utilized to reduce the radiation dose to as low as reasonably achievable. COMPARISON: CT of the abdomen and pelvis 07/02/2006. HISTORY: ORDERING SYSTEM PROVIDED HISTORY: lightheadedness, SOB, meeting sepsis criteria TECHNOLOGIST PROVIDED HISTORY: Reason for exam:->lightheadedness, SOB, meeting sepsis criteria Additional Contrast?->1 Reason for Exam: lightheadedness, SOB, meeting sepsis criteria; ORDERING SYSTEM PROVIDED HISTORY: meeting sepsis criteria, elevated lactic, transamitinitis TECHNOLOGIST PROVIDED HISTORY: Reason for exam:->meeting sepsis criteria, elevated lactic, transamitinitis Additional Contrast?->None Decision Support Exception - unselect if not a suspected or confirmed emergency medical condition->Emergency Medical Condition (MA) Reason for Exam: meeting sepsis criteria, elevated lactic, transamitinitis FINDINGS: CTA CHEST: Pulmonary Arteries: Pulmonary arteries are adequately opacified for evaluation.  No evidence of intraluminal filling defect to suggest pulmonary embolism.  Mildly enlarged main pulmonary artery at 3.5 cm. Mediastinum: Ectasia of the ascending aorta measuring maximally 4.1 cm.  The remainder of the thoracic aorta is normal in caliber.  The heart is not enlarged with no pericardial effusion.  The esophagus is unremarkable.  No pathologic hilar or mediastinal adenopathy. Lungs/pleura: The lungs are clear.  No  infiltrate, consolidation or edema. No pleural fluid or pneumothorax.  The central airways are patent. Soft Tissues/Bones: No acute bone or soft tissue abnormality. CT ABDOMEN AND PELVIS Organs: Hepatic steatosis with hepatomegaly.  Nodular hepatic contour suggesting underlying cirrhosis.  No focal hepatic abnormality. Cholelithiasis with no acute features.  The spleen, pancreas and adrenal glands are unremarkable.  Both kidneys enhance normally with no mass or significant hydronephrosis. GI/Bowel: The stomach and duodenal sweep are unremarkable.  No small bowel distension.  No evidence of appendicitis.  No pericolonic inflammatory changes. Pelvis: No pelvic mass or free pelvic fluid.  The prostate is unremarkable. Mild distention of the urinary bladder. Peritoneum/Retroperitoneum: The abdominal aorta is normal in caliber with scattered calcified atherosclerotic plaque.  No retroperitoneal adenopathy or upper abdominal ascites.  The SMV, splenic vein and portal vein are patent. Small upper abdominal varices are noted. Bones/Soft Tissues: No acute osseous or soft tissue abnormality.     1. No evidence of pulmonary embolic disease. 2. Ectasia of the ascending aorta at 4.1 cm. 3. Mildly enlarged main pulmonary artery at 3.5 cm suggesting pulmonary hypertension. 4. No acute findings within the abdomen or pelvis.  No acute bowel pathology. 5. Hepatic cirrhosis.  Small upper abdominal varices are suggested suggesting portal hypertension.  Hepatic steatosis with hepatomegaly. 6. Cholelithiasis with no acute features.     CT ABDOMEN PELVIS W IV CONTRAST Additional Contrast? None  Result Date: 5/30/2025  EXAMINATION: CTA OF THE CHEST; CT OF THE ABDOMEN AND PELVIS WITH CONTRAST 5/30/2025 TECHNIQUE: CTA of the chest was performed after the administration of intravenous contrast.  Multiplanar reformatted images are provided for review.  MIP images are provided for review. Automated exposure control, iterative reconstruction,

## 2025-06-01 NOTE — PROGRESS NOTES
IM Progress Note    Admit Date:  5/30/2025  2    Interval history:  presented for alcohol detox but later with GI bleed, hypotensive, anemic , transferred to ICU   Placed TLC, started pressors   Intubated , EGD with oozing but no active bleed, old clot noted  CTA with no bleed  Now on vent support with severe hypotensive shock on 3 pressors , CRRT initiated for severe metabolic acidosis and hyperkalemia    6/1-  Given 1 more PRBC  yesterday  persistent hypotension on 3 pressors, worsening acidosis and hyperkalemia , given calcium, D5 and bicarb push overnight      Subjective:  Mr. Herring seen sedated on vent , on multiple pressors  Family ( elderly parents ) at bedside aware of poor prognosis    Objective:   BP (!) 120/45   Pulse (!) 118   Temp 99.5 °F (37.5 °C) (Bladder)   Resp (!) 33   Ht 1.778 m (5' 10\")   Wt 114.3 kg (251 lb 15.8 oz)   SpO2 97%   BMI 36.16 kg/m²     Intake/Output Summary (Last 24 hours) at 6/1/2025 0726  Last data filed at 6/1/2025 0700  Gross per 24 hour   Intake 37495.31 ml   Output 6331 ml   Net 5796.31 ml       Physical Exam:        General: middle aged male on vent support  Oral ETT and OG noted  Right IJ TLC Appears to be not in any distress  Mucous Membranes:  Pink , anicteric  Neck: No JVD, no carotid bruit, no thyromegaly  Chest:  Clear to auscultation bilaterally, diminished in bases  Cardiovascular:  RRR S1S2 heard, no murmurs or gallops  Abdomen:  Soft, +distended, non tender, no organomegaly, BS present  Extremities: No edema or cyanosis. Well perfused Distal pulses well felt  Neurological : sedated    Medications:   Scheduled Medications:    albumin human 25%        vancomycin  1,000 mg IntraVENous Once    metroNIDAZOLE  500 mg IntraVENous Q8H    hydrocortisone sodium succinate PF  50 mg IntraVENous Q6H    balsum peru-castor oil   Topical BID    vancomycin (VANCOCIN) intermittent dosing (placeholder)   Other RX Placeholder    cefepime  2,000 mg IntraVENous Q8H    lidocaine 1    Vascular duplex upper extremity arteries left with complete physiologic Doppler    (Results Pending)   XR CHEST PORTABLE    (Results Pending)         Assessment & Plan:        #Acute upper GI bleed:   #Acute blood loss anemia   #Alcoholic cirrhosis   - Likely gastric variceal bleed in the setting of alcohol abuse, portal hypertension and hx of cirrhosis   - GI consulted  - Status post EGD in the ICU .  Unable to identify clear source but there appears to be extravasation of blood in stomach , view limited with retained food.  CTA abd with no active bleed noted  , could consider transfer to other hospital for possible TIPS procedure consideration  - transfused multiple PRBC, 1 unit FFP and 1 Plt    - Continue abx for SBP prophylaxis  - IV Protonix infusion, octreotide drip continued   - H&H every 6 hours- hb trending down, for one more unit PRBC today  - levophed support  - GI and critical care f/w        #Hemorrhagic/hypotensive shock:  #Lactic acidosis  - Intensivist consulted, vent settings per pulmonologist  - On Levophed and vasopressin, idalmis, wean as tolerated   - monitor lactate , remains high at 18  >30 today with severe metabolic acidosis and hyperkalemia  - pt remains critically ill with no response to maximal medical therapy   Grave prognosis explained to family at bedside   Code status changed to limited     # Acute resp failure - intubated for GI procedure and impending shock  - vent mx per pulm     #Alcohol abuse with history of alcohol withdrawal:  #Cirrhosis with portal hypertension  - Was started on phenobarbital in the ED.  Will stop and instead keep patient on propofol infusion now that he is intubated    # ARF /AGMA /Hyperkalemia - sec to shock   Nephrology consulted, started bicarb fluids  started CRRT today   Persistent acidosis remains      # abn LFT - sec to shock liver      #Hypertension:  - Hold home blood pressure medications.  Currently in shock     Scd    - pt remains critically ill with

## 2025-06-01 NOTE — PROGRESS NOTES
06/01/25 0302   Patient Observation   Pulse (!) 117   Respirations 21   SpO2 93 %   Breath Sounds   Breath Sounds Bilateral Diminished   Right Upper Lobe Diminished   Right Middle Lobe Diminished   Right Lower Lobe Diminished   Left Upper Lobe Diminished   Left Lower Lobe Diminished   Vent Information   Vent Mode AC/VC   Ventilator Settings   Vt (Set, mL) 470 mL   Resp Rate (Set) 26 bpm   PEEP/CPAP (cmH2O) 8   FiO2  (S)  70 %   Vent Patient Data (Readings)   Vt (Measured) 784 mL   Peak Inspiratory Pressure (cmH2O) 26 cmH2O   Rate Measured 33 br/min   Minute Volume (L/min) 14.5 Liters   Peak Inspiratory Flow (lpm) 75 L/sec   Mean Airway Pressure (cmH2O) 15 cmH20   I:E Ratio 1:2.4   Flow Sensitivity 3 L/min   Backup Apnea On   Backup Rate 26 Breaths Per Minute   Backup Vt 470   Vent Alarm Settings   High Pressure (cmH2O) 40 cmH2O   Low Minute Volume (lpm) 4 L/min   High Minute Volume (lpm) 26 L/min   Low Exhaled Vt (ml) 250 mL   High Exhaled Vt (ml) 1100 mL   RR High (bpm) 40 br/min   Apnea (secs) 20 secs   Additional Respiratoray Assessments   Humidification Source Heated wire   Humidification Temp 37   Circuit Condensation Drained   Ambu Bag With Mask At Bedside Yes   Airway Clearance   Suction ET Tube   Subglottic Suction Done No   Suction Device Inline suction catheter   Sputum Method Obtained Endotracheal   Sputum Amount Other (comment)  (none)   ETT    Placement Date/Time: 05/30/25 1900   Present on Admission/Arrival: No  Placed By: Licensed provider  Placement Verified By: Auscultation;Chest X-ray;Colorimetric ETCO2 device  Preoxygenation: Yes  Mask Ventilation: Ventilated by mask (1)  Technique: D...   Secured At 25 cm   Measured From Lips   ETT Placement Left   Secured By Commercial tube james   Site Assessment Dry   Tie/James Changed No   Ventilator Associated Pneumonia Bundle   Elevation of Head of Bed to 30-45 Degrees  Yes   Oral Care Completed No   Oral Suctioning No   ETT/Trach Suctioning Yes

## 2025-06-01 NOTE — PROGRESS NOTES
06/01/25 1905   Patient Observation   Pulse (!) 115   Respirations 26   SpO2 (!) 53 %   Breath Sounds   Breath Sounds Bilateral Diminished   Right Upper Lobe Diminished   Right Middle Lobe Diminished   Right Lower Lobe Diminished   Left Upper Lobe Diminished   Left Lower Lobe Diminished   Vent Information   Vent Mode AC/VC   Ventilator Settings   Vt (Set, mL) 470 mL   Resp Rate (Set) 26 bpm   PEEP/CPAP (cmH2O) 8   FiO2  100 %   Vent Patient Data (Readings)   Vt (Measured) 460 mL   Peak Inspiratory Pressure (cmH2O) 29 cmH2O   Rate Measured 26 br/min   Minute Volume (L/min) 11.9 Liters   Peak Inspiratory Flow (lpm) 75 L/sec   Mean Airway Pressure (cmH2O) 15 cmH20   I:E Ratio 1:2.4   Flow Sensitivity 3 L/min   Backup Apnea On   Backup Rate 26 Breaths Per Minute   Backup Vt 470   Vent Alarm Settings   High Pressure (cmH2O) 40 cmH2O   Low Minute Volume (lpm) 4 L/min   High Minute Volume (lpm) 26 L/min   Low Exhaled Vt (ml) 250 mL   High Exhaled Vt (ml) 1100 mL   RR High (bpm) 40 br/min   Apnea (secs) 20 secs   Additional Respiratoray Assessments   Humidification Source Heated wire   Humidification Temp 37   Circuit Condensation Drained   Ambu Bag With Mask At Bedside Yes   ETT    Placement Date/Time: 05/30/25 1900   Present on Admission/Arrival: No  Placed By: Licensed provider  Placement Verified By: Auscultation;Chest X-ray;Colorimetric ETCO2 device  Preoxygenation: Yes  Mask Ventilation: Ventilated by mask (1)  Technique: D...   Secured At 25 cm   Measured From Lips   ETT Placement Right   Secured By Commercial tube james   Site Assessment Dry   Cuff Pressure   (MOV)   Tie/James Changed No   Ventilator Associated Pneumonia Bundle   Elevation of Head of Bed to 30-45 Degrees  Yes   Oral Care Completed No   Oral Suctioning No   ETT/Trach Suctioning No

## 2025-06-01 NOTE — PROGRESS NOTES
Department of Internal Medicine  Nephrology Attending Progress Note        IMPRESSION/RECOMMENDATIONS:      1-RACHAEL:  KDIGO stage 2 of 3:  an increase in serum creatinine that is 2.0-2.9 times baseline.  This also can be defined as urine output of less than 0.5 ml/kg/hr for >/= 12 hours.  --overwhelming acidosis and elevated K require RRT  --ATN established  --worsening, appears to be dying  --discussed code status with family and will make DNR CCA  --family on there way in  --increase flow rates for now     2-Anion gap MA:  --lactate overwhelming and worsening  --Needs aggressive CRRT to clear lactate     3-Hyperkalemia:  --K remains 5.2  --Some degree of issue with obstruction     4-GI hemorrhage:  --decompressed varices noted on EGD  --Notes of GI reviewed    5-Code Status:  --discussed with family that he appears to be terminal  --Code status discussed and changed to DNR Limited.        SUBJECTIVE:  We are following this patient for rachael.  The patient is overall much worse today.  Lactate now 30.  Pressor requirement worse, acidosis worse, acidemia worse.      Physical Exam:    VITALS:  BP (!) 120/45   Pulse (!) 118   Temp 99.1 °F (37.3 °C) (Bladder)   Resp (!) 33   Ht 1.778 m (5' 10\")   Wt 114.3 kg (251 lb 15.8 oz)   SpO2 97%   BMI 36.16 kg/m²   TEMPERATURE:  Current - Temp: 99.1 °F (37.3 °C); Max - Temp  Av.5 °F (38.1 °C)  Min: 99.1 °F (37.3 °C)  Max: 101 °F (38.3 °C)  RESPIRATIONS RANGE: Resp  Av.6  Min: 15  Max: 36  PULSE RANGE: Pulse  Av.9  Min: 114  Max: 134  BLOOD PRESSURE RANGE:  Systolic (24hrs), Av , Min:73 , Max:137   ; Diastolic (24hrs), Av, Min:35, Max:81    24HR INTAKE/OUTPUT:    Intake/Output Summary (Last 24 hours) at 2025 0839  Last data filed at 2025 0800  Gross per 24 hour   Intake 19220.99 ml   Output 6639 ml   Net 4914.99 ml       Social History:  Family not present.      Systems Review:  UTO    Exam:  Constitutional:  intubated and sedated

## 2025-06-01 NOTE — PROGRESS NOTES
06/01/25 1130   Breath Sounds   Breath Sounds Bilateral Diminished   Right Upper Lobe Diminished   Right Middle Lobe Diminished   Right Lower Lobe Diminished   Left Upper Lobe Diminished   Left Lower Lobe Diminished   Vent Information   Vent Mode AC/VC   Ventilator Settings   Vt (Set, mL) 470 mL   Resp Rate (Set) 26 bpm   PEEP/CPAP (cmH2O) 8   FiO2  50 %   Vent Patient Data (Readings)   Vt (Measured) 534 mL   Peak Inspiratory Pressure (cmH2O) 27 cmH2O   Rate Measured 33 br/min   Minute Volume (L/min) 14.7 Liters   Peak Inspiratory Flow (lpm) 75 L/sec   Mean Airway Pressure (cmH2O) 15 cmH20   I:E Ratio 1:2.4   Flow Sensitivity 3 L/min   Additional Respiratoray Assessments   Humidification Source Heated wire   Humidification Temp 36.9   Circuit Condensation Drained   Ambu Bag With Mask At Bedside Yes   Airway Clearance   Suction ET Tube   Subglottic Suction Done No   Suction Device Inline suction catheter   Sputum Method Obtained Endotracheal   Sputum Amount   (none)

## 2025-06-01 NOTE — PROGRESS NOTES
at bed side to assess the Pt, since pt has multiple critical labs and pressor requirement increasing. MD ordered hourly ABG's and one amp of Bicarb push if pH is less than 7.15 hourly. CXR and Abdominal X ray done stat. I/V  Albumin given 100 mg given. I/V Calcium gluconate 2 gm given. D 5 LR started because of shock liver and hypoglycemia by .      0650: Total of 7 amps of Bicarb given tonight.

## 2025-06-01 NOTE — PROGRESS NOTES
[facility] called- information provided.     Following for possible organ donation. No case number assigned. Rn to call with changes in status or time of death.

## 2025-06-01 NOTE — PROGRESS NOTES
Pulmonary & Critical Care Medicine ICU Progress Note    CC: GI bleed/shock    Events of Last 24 hours:   Fortunately continues to deteriorate despite aggressive measures  Propofol 40 mcg/kg/min   Fentanyl 150 mcg/hr   Protonix drip   Octreotide 5 mg/hr   Levophed 60-->80 mcg/min   Neosynephrine 150-->175 mcg/min   Vasopressin 0.04 unit/minute   Bicarb drip 150 cc/hr   CRRT started 5/31  RR 26    PEEP 5--->8  FiO2 45--->50%  BM slowed down   NG to suction - CC output 400 dark         Vascular lines: IV:   Right IJ 5/30  Left IJ HD catheter 5/31  Left radial A-line 5/30-5/31  Right femoral A-line 5/31    MV: 5/30  Vent Mode: AC/VC Resp Rate (Set): 26 bpm/Vt (Set, mL): 470 mL/ /FiO2 : 50 %  Recent Labs     06/01/25 0510 06/01/25 0611   PHART 7.065* 7.065*   [id number] 22.0* 24.2*   PO2ART 224.0* 165.3*       IV:   phenylephrine (JAGRUTI-SYNEPHRINE) 50 mg in sodium chloride 0.9 % 250 mL infusion 175 mcg/min (06/01/25 0700)    VASOpressin 20 Units in sodium chloride 0.9 % 100 mL infusion 0.04 Units/min (06/01/25 0700)    prismaSol BGK 2/3.5 1,500 mL/hr (06/01/25 0632)    prismaSol BGK 2/3.5 500 mL/hr (05/31/25 2336)    prismaSol BGK 2/3.5 500 mL/hr (05/31/25 2334)    dextrose      dextrose      dextrose 5% in lactated ringers 75 mL/hr at 06/01/25 0700    octreotide (SANDOSTATIN) 500 mcg in sodium chloride 0.9 % 100 mL infusion 50 mcg/hr (06/01/25 0700)    pantoprazole (PROTONIX) 80 mg in sodium chloride 0.9 % 100 mL infusion 8 mg/hr (06/01/25 0700)    sodium chloride      dexmedeTOMIDine HCl in NaCl      norepinephrine 80 mcg/min (06/01/25 0700)    propofol 50 mcg/kg/min (06/01/25 0700)    fentaNYL 150 mcg/hr (06/01/25 0700)    sodium bicarbonate 150 mEq in dextrose 5 % 1,000 mL infusion 150 mL/hr at 06/01/25 0700       Vitals:  Blood pressure (!) 120/45, pulse (!) 118, temperature 99.5 °F (37.5 °C), temperature source Bladder, resp. rate (!) 33, height 1.778 m (5' 10\"), weight 114.3 kg (251 lb 15.8 oz), SpO2 97%.

## 2025-06-01 NOTE — PROGRESS NOTES
05/31/25 7647   Patient Observation   Pulse (!) 122   Respirations 16   Vent Information   Vent Mode AC/VC   Ventilator Settings   Vt (Set, mL) 470 mL   Resp Rate (Set) 26 bpm   PEEP/CPAP (cmH2O) 8   FiO2  50 %   Vent Patient Data (Readings)   Vt (Measured) 842 mL   Peak Inspiratory Pressure (cmH2O) 27 cmH2O   Rate Measured 37 br/min   Minute Volume (L/min) 15.6 Liters   Peak Inspiratory Flow (lpm) 75 L/sec   Mean Airway Pressure (cmH2O) 9.2 cmH20   I:E Ratio 1:2.4   Flow Sensitivity 3 L/min   Backup Apnea On   Backup Rate 26 Breaths Per Minute   Backup Vt 470   Vent Alarm Settings   High Pressure (cmH2O) 40 cmH2O   Low Minute Volume (lpm) 4 L/min   High Minute Volume (lpm) 26 L/min   Low Exhaled Vt (ml) 250 mL   High Exhaled Vt (ml) 1100 mL   RR High (bpm) 40 br/min   Apnea (secs) 20 secs   Additional Respiratoray Assessments   Humidification Source Heated wire   Humidification Temp 37   Circuit Condensation Drained   Ambu Bag With Mask At Bedside Yes   Airway Clearance   Suction ET Tube   Subglottic Suction Done No   Suction Device Inline suction catheter   Sputum Method Obtained Endotracheal   Sputum Amount Scant   ETT    Placement Date/Time: 05/30/25 1900   Present on Admission/Arrival: No  Placed By: Licensed provider  Placement Verified By: Auscultation;Chest X-ray;Colorimetric ETCO2 device  Preoxygenation: Yes  Mask Ventilation: Ventilated by mask (1)  Technique: D...   Secured At 25 cm   Measured From Lips   ETT Placement Left   Secured By Commercial tube james   Site Assessment Dry   Tie/James Changed No   Ventilator Associated Pneumonia Bundle   Elevation of Head of Bed to 30-45 Degrees  Yes   Oral Care Completed No   Oral Suctioning No   ETT/Trach Suctioning Yes

## 2025-06-01 NOTE — PROGRESS NOTES
Spiritual Health History and Assessment/Progress Note  [facility]    (P) Spiritual/Emotional Needs, Grief, Loss, and Adjustments, Family Care, (P) Emotional distress, Family Care, (P) Anticipatory Grief, End of Life,      Name: Rod Herring MRN: [id number]    Age: 55 y.o.     Sex: male   Language: English   Moravian: Non-Orthodoxy   Alcohol withdrawal syndrome without complication (HCC)     Date: 6/1/2025            Total Time Calculated: (P) 42 min              Spiritual Assessment began in [facility]        Referral/Consult From: (P) Nurse   Encounter Overview/Reason: (P) Spiritual/Emotional Needs, Grief, Loss, and Adjustments, Family Care  Service Provided For: (P) Patient and family together    Lissa, Belief, Meaning:   Patient unable to assess at this time  Family/Friends identify as spiritual, are connected with a lissa tradition or spiritual practice, and have beliefs or practices that help with coping during difficult times      Importance and Influence:  Patient unable to assess at this time  Family/Friends have spiritual/personal beliefs that influence decisions regarding the patient's health    Community:  Patient Other: Unable to assess at this time  Family/Friends feel well-supported. Support system includes: Spouse/Partner, Parent/s, and Children    Assessment and Plan of Care:     Patient Interventions include: Other: Unable to assess at this time  Family/Friends Interventions include: Facilitated expression of thoughts and feelings, Explored spiritual coping/struggle/distress, Affirmed coping skills/support systems, Provided sacramental/Yarsanism ritual, and Facilitated life review and/or legacy    Patient Plan of Care: Spiritual Care available upon further referral  Family/Friends Plan of Care: Spiritual Care available upon further referral    Electronically signed by Chaplain Bekah on 6/1/2025 at 11:00 AM

## 2025-06-02 NOTE — PLAN OF CARE
Problem: Pain  Goal: Verbalizes/displays adequate comfort level or baseline comfort level  Outcome: Progressing  Flowsheets (Taken 6/2/2025 0111)  Verbalizes/displays adequate comfort level or baseline comfort level:   Encourage patient to monitor pain and request assistance   Assess pain using appropriate pain scale   Administer analgesics based on type and severity of pain and evaluate response     Problem: Safety - Medical Restraint  Goal: Remains free of injury from restraints (Restraint for Interference with Medical Device)  Description: INTERVENTIONS:1. Determine that other, less restrictive measures have been tried or would not be effective before applying the restraint2. Evaluate the patient's condition at the time of restraint application3. Inform patient/family regarding the reason for restraint4. Q2H: Monitor safety, psychosocial status, comfort, nutrition and hydration  Outcome: Progressing  Flowsheets  Taken 6/2/2025 0111 by Shai Hill RN  Remains free of injury from restraints (restraint for interference with medical device): Every 2 hours: Monitor safety, psychosocial status, comfort, nutrition and hydration  Taken 6/2/2025 0000 by Shai Hill RN  Remains free of injury from restraints (restraint for interference with medical device): Every 2 hours: Monitor safety, psychosocial status, comfort, nutrition and hydration  Taken 6/1/2025 2200 by Shai Hill RN  Remains free of injury from restraints (restraint for interference with medical device): Every 2 hours: Monitor safety, psychosocial status, comfort, nutrition and hydration  Taken 6/1/2025 2000 by Shai Hill RN  Remains free of injury from restraints (restraint for interference with medical device): Every 2 hours: Monitor safety, psychosocial status, comfort, nutrition and hydration  Taken 6/1/2025 1800 by Nila Wilcox RN  Remains free of injury from restraints (restraint for interference with medical device): Every 2 hours:

## 2025-06-02 NOTE — PROGRESS NOTES
Post mortem care provided. All lines removed except Vas cath. Security made aware of body to be picked up.

## 2025-06-02 NOTE — PROGRESS NOTES
Pt has no HR on the monitor. On auscultation with 2 RN's No HR or no pulse present. Informed  and MD pronounced TOD at 01:26. Pt's Sister present in the room.

## 2025-06-02 NOTE — PROGRESS NOTES
Pt's BP low. Pt maxed out of Levo/ Vaso and Noam.Pt's sister at bed side, aware of the situation. Pt is limited code now. Keeping Pt comfortable.

## 2025-06-02 NOTE — PROGRESS NOTES
Pedal Pulses in left foot via doopler     Liya So RN  05/18/21 3475 Pt taken [place] [facility] [place].

## 2025-06-02 NOTE — PROGRESS NOTES
Pt remains Intubated and Sedated. ET tube # 7.5 in place. LL 25. OG at 80 cm, connected to LWIS.  Vent setting AC/VC 26/470/100/8.  Noam-Synephrine infusing at 200 mcg/min.  Vasopressin @ 0.04 Units/min.  Levophed @ 100 mcg/min.  Fentanyl @ 150 mcg//hr.  Propofol infusing at 40 mcg/kg/min.     Protonix drip @ 8 mg/hr.  Octreotide drip @ 50 mcg/hr.  D 5% LR @ 75 ml/hr.  Na Bicarb 150 mEq in Dex 5 % @ 150 ml/hr.  CRRT continues. Blood flow @ 250 ml/hr.  Pre and Post blood infusing at 1000 ml/hr.  Dialysate infusing at 2000 ml/hr.  Trying to keep Pt even per nephrology.  Right I/J CVC and Left Temporary Vas cath I/J in place along with one PIV.  Temperature sensing May in place.   Flexi seal in place.  Right Femoral arterial line.  BUE soft wrist restraints in place due to attempts of pulling at ETT and lines. No signs of injury noted and PROM performed.

## 2025-06-02 NOTE — PROGRESS NOTES
06/01/25 2328   Patient Observation   Pulse 99   Respirations 15   Vent Information   Vent Mode AC/VC   Ventilator Settings   Vt (Set, mL) 470 mL   Resp Rate (Set) 26 bpm   PEEP/CPAP (cmH2O) 8   FiO2  100 %   Vent Patient Data (Readings)   Vt (Measured) 523 mL   Peak Inspiratory Pressure (cmH2O) 28 cmH2O   Rate Measured 26 br/min   Minute Volume (L/min) 13.5 Liters   Peak Inspiratory Flow (lpm) 75 L/sec   Mean Airway Pressure (cmH2O) 14 cmH20   I:E Ratio 1:2.4   Flow Sensitivity 3 L/min   Backup Apnea On   Backup Rate 26 Breaths Per Minute   Backup Vt 470   Vent Alarm Settings   High Pressure (cmH2O) 40 cmH2O   Low Minute Volume (lpm) 4 L/min   High Minute Volume (lpm) 26 L/min   Low Exhaled Vt (ml) 250 mL   High Exhaled Vt (ml) 1100 mL   RR High (bpm) 40 br/min   Apnea (secs) 20 secs   Additional Respiratoray Assessments   Humidification Source Heated wire   Humidification Temp 37   Circuit Condensation Drained   Ambu Bag With Mask At Bedside Yes   ETT    Placement Date/Time: 05/30/25 1900   Present on Admission/Arrival: No  Placed By: Licensed provider  Placement Verified By: Auscultation;Chest X-ray;Colorimetric ETCO2 device  Preoxygenation: Yes  Mask Ventilation: Ventilated by mask (1)  Technique: D...   Secured At 25 cm   Measured From Lips   ETT Placement Right   Secured By Commercial tube james   Site Assessment Dry   Tie/James Changed No   Ventilator Associated Pneumonia Bundle   Elevation of Head of Bed to 30-45 Degrees  Yes   Oral Care Completed No   Oral Suctioning No   ETT/Trach Suctioning No

## 2025-06-02 NOTE — PROGRESS NOTES
4 Eyes Skin Assessment     NAME:  Rod Herring  YOB: 1970  MEDICAL RECORD NUMBER:  [id number]    The patient is being assessed for  Shift Handoff    I agree that at least one RN has performed a thorough Head to Toe Skin Assessment on the patient. ALL assessment sites listed below have been assessed.      Areas assessed by both nurses:    Head, Face, Ears, Shoulders, Back, Chest, Arms, Elbows, Hands, Sacrum. Buttock, Coccyx, Ischium, Legs. Feet and Heels, and Under Medical Devices         Does the Patient have a Wound? Yes wound(s) were present on assessment. LDA wound assessment was Initiated and completed by RN     Redness on Right ear.  Redness, discoloration and swelling on left hand and arm.  Oscar Prevention initiated by RN: Yes  Wound Care Orders initiated by RN: Yes    Pressure Injury (Stage 3,4, Unstageable, DTI, NWPT, and Complex wounds) if present, place Wound referral order by RN under : No    New Ostomies, if present place, Ostomy referral order under : no    Nurse 1 eSignature: Electronically signed by Shai Hill RN on 6/2/25 at 1:16 AM EDT    **SHARE this note so that the co-signing nurse can place an eSignature**    Nurse 2 eSignature: Electronically signed by Lamont Durant RN on 6/2/25 at 1:19 AM EDT     Patient is not able to demonstrated the ability to move from a reclining position to an upright position within the recliner. Patient is confused, demented and /or unable to follow instruction.

## 2025-06-02 NOTE — PROGRESS NOTES
Fentanyl 90 ml wasted with ROSANARN.    Witnessed the waste of 90ml Fentanyl.   Electronically signed by Lamont Durant RN on 6/2/2025 at 6:05 AM

## 2025-06-04 LAB
BACTERIA BLD CULT ORG #2: NORMAL
BACTERIA BLD CULT: NORMAL

## 2025-07-09 NOTE — DISCHARGE SUMMARY
Name:  Rod Herring  Room:  3005/3005-01  MRN:    [id number]    IM Discharge Summary    Discharging Physician:  Kamlesh smith MD    Admit: 5/30/2025    Discharge:  6/2/2025    PCP      Alexei Aguirre MD       THIS PT PASSED AWAY DURING THIS ADMISSION       HPI     55 y.o. male who presented to [facility] with acute GI bleed in the setting of alcohol abuse.  PMHx significant for alcohol abuse with history of withdrawal, depression, hypertension.       All of history obtained by chart review.  Unable to gather history from patient as he is intubated.      Noted that patient drinks 30 beers a day and has been doing this for many years.  He presented to the department with worsening palpitations shortness of breath and feels like he was having a panic attack.  He drinks 30 beers a day and drink Lasix this morning as well as taking a 10 mg dose of Valium and then subsequently developed lightheadedness.  In the ED, was noted that he vomited large amount of coffee-ground emesis and had also had an episode just prior to presentation but did not endorse this to ER team initially.  He quickly decompensated in the emergency department and required ICU admission.     In the ED, patient was noted to be tachycardic, hypotensive, tachypneic.  Was initially on room air but required intubation as well as pressor initiation.  BMP was obtained showed sodium 132, chloride 92, bicarb of 21 with anion gap of 19.  Initial lactic was 2.7 with send of 4.7.  Glucose was elevated.  Initial troponin was 26 then continues to uptrend.  , , .  UDS positive for benzodiazepines, otherwise unremarkable.  CBC initially showed WBC of 8.8 and hemoglobin of 11.9 on presentation.  6 hours after that, repeat H&H showed hemoglobin 7.5 and then 4 hours after that, hemoglobin resulted again down to 6.1.     Chest x-ray no acute process.  CT head no acute process.  CTPA was performed no evidence of acute PE.  Ectasia of the  06/01/2025 06:08 AM    CO2 7 06/02/2025 12:50 AM    BUN 9 06/02/2025 12:50 AM    CREATININE 1.6 06/02/2025 12:50 AM    CALCIUM 7.4 06/02/2025 12:50 AM           Diagnoses and hospital course  this Admission         #Acute upper GI bleed:   #Acute blood loss anemia   #Alcoholic cirrhosis   - Likely gastric variceal bleed in the setting of alcohol abuse, portal hypertension and hx of cirrhosis   - GI consulted  - Status post EGD in the ICU .  Unable to identify clear source but there appears to be extravasation of blood in stomach , view limited with retained food.  CTA abd with no active bleed noted  , could consider transfer to other hospital for possible TIPS procedure consideration  - transfused multiple PRBC, 1 unit FFP and 1 Plt    - Continue abx for SBP prophylaxis  - IV Protonix infusion, octreotide drip continued   - H&H every 6 hours- hb trending down, for one more unit PRBC today  - levophed support  - GI and critical care f/w         #Hemorrhagic/hypotensive shock:  #Lactic acidosis  - Intensivist consulted, vent settings per pulmonologist  - On Levophed and vasopressin, idalmis, wean as tolerated   - monitor lactate , remains high at 18  >30 today with severe metabolic acidosis and hyperkalemia  - pt remains critically ill with no response to maximal medical therapy   Grave prognosis explained to family at bedside   Code status changed to limited   Pt maxed out on pressor support and persistent acidosis and hypotension remained despite of maximal medical mx  After discussion with family , he was kept comfortable on current meds and he passed away with continued hypotension        # Acute resp failure  #Alcohol abuse with history of alcohol withdrawal:  #Cirrhosis with portal hypertension  # ARF /AGMA /Hyperkalemia - sec to shock   # Abnormal  LFT - sec to shock liver               Kamlesh Alvarado MD, 7/9/2025 9:01 AM

## 2025-07-29 NOTE — PROGRESS NOTES
Physician Progress Note      PATIENT:               WENDI HERRING  CSN #:                  [id number]  :                       1970  ADMIT DATE:       2025 10:02 AM  DISCH DATE:        2025 8:34 AM  RESPONDING  PROVIDER #:        Kamlesh Alvarado MD          QUERY TEXT:    Sepsis is documented in the medical record Gastroenterology Progress Notes by   Karina Lewis MD at 2025.  Please clarify the source of sepsis:    The clinical indicators include:  -\"Patient met sepsis criteria, not endorsing any infectious symptoms but   lactate is elevated.\"(ED Provider Notes by Caitlin To PA-C at 2025)    -\"Sepsis, due to unspecified organism, unspecified whether acute organ   dysfunction present (HCC)\"(ED Provider Notes by Caitlin To PA-C at   2025; Gastroenterology Consults by Karina Lewis MD at   2025)    -\"Patient was meeting sepsis criteria but no infectious source was   identified.\"(ED Provider Notes by Jennifer Bell MD at 2025)    -\"Patient received fluids early on but delay in antibiotics given no   infectious source was identified.  There is still not been an infectious   source identified but we did proceed with antibiotics\"(ED Provider Notes by   Jennifer Bell MD at 2025)    -\"He has already been given antibiotics which will need to be continued for 7   days for prophylaxis.\"(Gastroenterology Consults by Karina Lewis MD   at 2025)    -\"RACHAEL:  KDIGO stage 2 of 3:  an increase in serum creatinine that is 2.0-2.9   times baseline.  This also can be defined as urine output of less than 0.5   ml/kg/hr for >/= 12 hours.\"(Nephrology Consults by Bright Herring MD at   2025)    -\"Also noted to have lactic acidosis/shock and elevated   LFTs.\"(Gastroenterology Progress Notes by Karina Lewis MD at   2025)    -Pulse 117, 109, 105 (ED Provider Notes by Caitlin To PA-C at 2025)    -WBC 8.8, 27.6, 22.4,  -Iron studies showed iron 33, TIBC 307, iron saturation 11. Reticulocyte count 1.6.  -Hemoglobin is 11.2 (stable).  -No active bleeding.  -start on iron tablets 5/27.  - Follow up   22.8(EPIC lab result)    -Procalcitonin: 0.62 (H)(EPIC lab result)    -Lactic acid 2.7, 4.7, 8.6, 11.2(EPIC lab result)  Options provided:  -- Sepsis present on admission due to unknown bacterial infection  -- Sepsis, present on admission due to, Please document source.  -- After further study, sepsis not suspected  -- Other - I will add my own diagnosis  -- Disagree - Not applicable / Not valid  -- Disagree - Clinically unable to determine / Unknown  -- Refer to Clinical Documentation Reviewer    PROVIDER RESPONSE TEXT:    This patient has sepsis present on admission due to unknown bacterial   infection.    Query created by: Shama Manriquez on 7/17/2025 2:36 PM      Electronically signed by:  Kamlesh Alvarado MD 7/29/2025 7:12 AM

## (undated) DEVICE — RETRIEVER ENDOSCP L160CM SHTH DIA2.5MM NET 4X5.5CM PLAT

## (undated) DEVICE — ELECTRODE RADIOTRANSLUCENT FOAM MEDGEL

## (undated) DEVICE — PROCEDURE KIT ENDOSCP CARRY-ON

## (undated) DEVICE — RETRIEVER ENDOSCP L230CM DIA2.5MM NET W3XL5.5CM MIN WRK CHN

## (undated) DEVICE — NEEDLE SCLERO 23GA L240CM OD064MM ID032MM CLR INTERJECT

## (undated) DEVICE — TIP SUCTION TRANSPAR RIB SURF STD BLB RIG  YANKAUER

## (undated) DEVICE — CANNULA SUPERSOFT AD 7FT STD

## (undated) DEVICE — SNARE SURG L230CM DIA28MM LOOP 15MM POLYP BRAID WIRE OVL

## (undated) DEVICE — BITE BLK L LUMN SZ 20X27MM GRN PLAS FLX SIDE PRT SMOOTH